# Patient Record
Sex: FEMALE | Race: WHITE | Employment: UNEMPLOYED | URBAN - METROPOLITAN AREA
[De-identification: names, ages, dates, MRNs, and addresses within clinical notes are randomized per-mention and may not be internally consistent; named-entity substitution may affect disease eponyms.]

---

## 2017-04-20 ENCOUNTER — TELEPHONE (OUTPATIENT)
Dept: INTERNAL MEDICINE CLINIC | Facility: CLINIC | Age: 32
End: 2017-04-20

## 2017-04-20 DIAGNOSIS — G80.9 CEREBRAL PALSY, UNSPECIFIED TYPE (HCC): Primary | ICD-10-CM

## 2017-04-20 NOTE — TELEPHONE ENCOUNTER
Incoming (mail or fax): Fax  Received from:  Ju- signature required  Documentation given to: [id number] fax bin.

## 2017-04-24 NOTE — TELEPHONE ENCOUNTER
Patient's mother scheduled an appointment for May 10th at 12:00. She is asking that the wheelchair parts be replaced as they have a wedding in June and it will take some time for this to be repaired.   Please advise

## 2017-04-24 NOTE — TELEPHONE ENCOUNTER
Mother asking for rx for wheelchair supplies be ordered as replacement parts needed and takes time for repair. Pt has May 10th f/u visit.

## 2017-05-10 ENCOUNTER — OFFICE VISIT (OUTPATIENT)
Dept: INTERNAL MEDICINE CLINIC | Facility: CLINIC | Age: 32
End: 2017-05-10

## 2017-05-10 VITALS
HEART RATE: 120 BPM | TEMPERATURE: 98 F | RESPIRATION RATE: 20 BRPM | OXYGEN SATURATION: 96 % | DIASTOLIC BLOOD PRESSURE: 60 MMHG | SYSTOLIC BLOOD PRESSURE: 90 MMHG

## 2017-05-10 DIAGNOSIS — Z00.00 ROUTINE PHYSICAL EXAMINATION: Primary | ICD-10-CM

## 2017-05-10 PROCEDURE — 99395 PREV VISIT EST AGE 18-39: CPT | Performed by: INTERNAL MEDICINE

## 2017-05-10 NOTE — PROGRESS NOTES
HPI:   Jose David Tompkins is a 27year old female who presents for a complete physical exam.  Pt wheelchair dependent  Pt eating a lot per mom loves food  She will be maid of honor at her sister Nabila montalvo ([facility] [place] [employer] dress theme)  She is excited about Occ: none. : single . Exercise: none.   Diet: cheese, yogurt  Takes calcium no vit D     REVIEW OF SYSTEMS:   GENERAL: feels well otherwise  SKIN: no rash  EYES:denies blurred vision or double vision  HEENT: denies nasal congestion, si a 27year old female who presents for a complete physical exam. explained. Health maintenance, will check fasting Lipids, CMP, and TSH.     Pt' s weight is not done pt  wheelchair dependenet  The patient indicates understanding of these issues and agrees to

## 2017-07-08 ENCOUNTER — HOSPITAL ENCOUNTER (OUTPATIENT)
Age: 32
Discharge: HOME | End: 2017-07-08
Attending: FAMILY MEDICINE
Payer: COMMERCIAL

## 2017-07-08 VITALS
OXYGEN SATURATION: 98 % | DIASTOLIC BLOOD PRESSURE: 80 MMHG | SYSTOLIC BLOOD PRESSURE: 124 MMHG | TEMPERATURE: 100 F | RESPIRATION RATE: 18 BRPM | HEART RATE: 99 BPM

## 2017-07-08 DIAGNOSIS — H61.21 IMPACTED CERUMEN OF RIGHT EAR: Primary | ICD-10-CM

## 2017-07-08 PROCEDURE — 99203 OFFICE O/P NEW LOW 30 MIN: CPT

## 2017-07-08 PROCEDURE — 99213 OFFICE O/P EST LOW 20 MIN: CPT

## 2017-07-08 PROCEDURE — 69210 REMOVE IMPACTED EAR WAX UNI: CPT

## 2017-07-08 NOTE — ED INITIAL ASSESSMENT (HPI)
Pt c/o right ear pain for the past 3 weeks. Pt also c/o sinus congestion. Denies any other complaints.

## 2017-07-08 NOTE — ED PROVIDER NOTES
Patient Seen in: [facility]    History   Patient presents with:  Ear Problem Pain (neurosensory)    Stated Complaint: Rt Ear Pain X 3 Weeks     HPI    Светланаl David is a 27year old female with previous history of cerebral Systems    Positive for stated complaint: Rt Ear Pain X 3 Weeks   Other systems are as noted in HPI. Constitutional and vital signs reviewed. All other systems reviewed and negative except as noted above.     PSFH elements reviewed from today and agre PM

## 2017-07-10 NOTE — ADDENDUM NOTE
Encounter addended by: Eloy Temple LPN on: 1/33/0252 37:79 AM<BR>    Actions taken: Letter status changed

## 2019-06-16 ENCOUNTER — OFFICE VISIT (OUTPATIENT)
Dept: FAMILY MEDICINE CLINIC | Facility: CLINIC | Age: 34
End: 2019-06-16
Payer: COMMERCIAL

## 2019-06-16 VITALS
HEART RATE: 120 BPM | SYSTOLIC BLOOD PRESSURE: 98 MMHG | OXYGEN SATURATION: 98 % | DIASTOLIC BLOOD PRESSURE: 66 MMHG | TEMPERATURE: 101 F

## 2019-06-16 DIAGNOSIS — R50.9 FEVER, UNSPECIFIED FEVER CAUSE: Primary | ICD-10-CM

## 2019-06-16 PROCEDURE — 87880 STREP A ASSAY W/OPTIC: CPT | Performed by: NURSE PRACTITIONER

## 2019-06-16 PROCEDURE — 87081 CULTURE SCREEN ONLY: CPT | Performed by: NURSE PRACTITIONER

## 2019-06-16 PROCEDURE — 99202 OFFICE O/P NEW SF 15 MIN: CPT | Performed by: NURSE PRACTITIONER

## 2019-06-16 NOTE — PROGRESS NOTES
CHIEF COMPLAINT:   Patient presents with:  Sore Throat: sore throat,sinus drainage, fever in the morning x 1 day      HPI:   Claudeen Moss is a 28year old female who presents for Fever Tmax 101.2 for 24 hours.  Febrile this morning- treated with advil and areas  HEAD: atraumatic, normocephalic.    EYES: conjunctiva clear, EOM intact  EARS: TM's intact, non- bulging,  No retraction, no fluid, bony landmarks present  NOSE: Nostrils patent, minimal clear nasal discharge, nasal mucosa mildly inflamed and edemato

## 2019-09-20 ENCOUNTER — OFFICE VISIT (OUTPATIENT)
Dept: INTERNAL MEDICINE CLINIC | Facility: CLINIC | Age: 34
End: 2019-09-20
Payer: COMMERCIAL

## 2019-09-20 VITALS
SYSTOLIC BLOOD PRESSURE: 110 MMHG | RESPIRATION RATE: 14 BRPM | OXYGEN SATURATION: 98 % | DIASTOLIC BLOOD PRESSURE: 68 MMHG | TEMPERATURE: 99 F | HEART RATE: 76 BPM

## 2019-09-20 DIAGNOSIS — Z13.220 SCREENING FOR LIPID DISORDERS: ICD-10-CM

## 2019-09-20 DIAGNOSIS — Z23 NEED FOR VACCINATION: ICD-10-CM

## 2019-09-20 DIAGNOSIS — Z13.29 SCREENING FOR THYROID DISORDER: ICD-10-CM

## 2019-09-20 DIAGNOSIS — G80.0 SPASTIC QUADRIPLEGIC CEREBRAL PALSY (HCC): ICD-10-CM

## 2019-09-20 DIAGNOSIS — M62.838 MUSCLE SPASM: ICD-10-CM

## 2019-09-20 DIAGNOSIS — Z00.00 ENCOUNTER FOR ANNUAL PHYSICAL EXAM: Primary | ICD-10-CM

## 2019-09-20 DIAGNOSIS — Z13.0 SCREENING FOR BLOOD DISEASE: ICD-10-CM

## 2019-09-20 PROCEDURE — 99395 PREV VISIT EST AGE 18-39: CPT | Performed by: INTERNAL MEDICINE

## 2019-09-20 PROCEDURE — 90471 IMMUNIZATION ADMIN: CPT | Performed by: INTERNAL MEDICINE

## 2019-09-20 PROCEDURE — 90686 IIV4 VACC NO PRSV 0.5 ML IM: CPT | Performed by: INTERNAL MEDICINE

## 2019-09-20 RX ORDER — BACLOFEN 5 MG/1
5 TABLET ORAL 3 TIMES DAILY
Qty: 90 TABLET | Refills: 2 | Status: SHIPPED | OUTPATIENT
Start: 2019-09-20 | End: 2019-10-20

## 2019-09-20 NOTE — PROGRESS NOTES
[facility] Annual Physical Exam Note    HPI:    Patient ID: Donny Zhao is a 28year old female.   Chief Complaint: Physical (Last: 217)  41-year-old female with past medical history of cerebral palsy with spastic quadriplegia as well as scol n/a    Obesity Screening  There is no height or weight on file to calculate BMI.     Diabetes Screening  No results found for: EAG, A1C    Dyslipidemia Screening  No results found for: CHOLEST, TRIG, HDL, LDL, VLDL, TCHDLRATIO, NONHDLC, CHOLHDLRATIO, NONHDL 07/14/2009         Past Medical History:   Diagnosis Date   • Allergic rhinitis 7/14/2009   • Allergy history, eggs    • Bleeding mole 11/2009    L face   • Foot callus 6/2008    R foot   • Neck pain on left side 7/2008   • Otitis 3/04,9/05    L   • Pharyn present. Mouth/Throat: Oropharynx is clear and moist. No oropharyngeal exudate or posterior oropharyngeal erythema. Eyes: Pupils are equal, round, and reactive to light. Conjunctivae are normal. No scleral icterus. Neck: Neck supple. No JVD present.  Arslan File ages 35 to 77      Imaging:   Pertinent imaging results reviewed. No results found. Assessment/Plan:   Diagnoses and all orders for this visit:    Encounter for annual physical exam  -     COMP METABOLIC PANEL (14);  Future    Screening for thyroid months to follow-up on benefit of baclofen and to monitor for adverse effects. Follow Up:   Return in about 3 months (around 12/20/2019) for follow up muscle spasms.     Patient asked to sign release of information from prior physicians/outside consult

## 2019-09-21 ENCOUNTER — LAB ENCOUNTER (OUTPATIENT)
Dept: LAB | Facility: HOSPITAL | Age: 34
End: 2019-09-21
Attending: INTERNAL MEDICINE
Payer: COMMERCIAL

## 2019-09-21 DIAGNOSIS — Z13.220 SCREENING FOR LIPID DISORDERS: ICD-10-CM

## 2019-09-21 DIAGNOSIS — Z13.0 SCREENING FOR BLOOD DISEASE: ICD-10-CM

## 2019-09-21 DIAGNOSIS — G80.0 SPASTIC QUADRIPLEGIC CEREBRAL PALSY (HCC): ICD-10-CM

## 2019-09-21 DIAGNOSIS — Z13.29 SCREENING FOR THYROID DISORDER: ICD-10-CM

## 2019-09-21 DIAGNOSIS — Z00.00 ENCOUNTER FOR ANNUAL PHYSICAL EXAM: ICD-10-CM

## 2019-09-21 LAB
ALBUMIN SERPL-MCNC: 4.1 G/DL (ref 3.4–5)
ALBUMIN/GLOB SERPL: 1.1 {RATIO} (ref 1–2)
ALP LIVER SERPL-CCNC: 50 U/L (ref 37–98)
ALT SERPL-CCNC: 17 U/L (ref 13–56)
ANION GAP SERPL CALC-SCNC: 5 MMOL/L (ref 0–18)
AST SERPL-CCNC: 12 U/L (ref 15–37)
BASOPHILS # BLD AUTO: 0.04 X10(3) UL (ref 0–0.2)
BASOPHILS NFR BLD AUTO: 0.6 %
BILIRUB SERPL-MCNC: 0.9 MG/DL (ref 0.1–2)
BUN BLD-MCNC: 7 MG/DL (ref 7–18)
BUN/CREAT SERPL: 15.9 (ref 10–20)
CALCIUM BLD-MCNC: 9.2 MG/DL (ref 8.5–10.1)
CHLORIDE SERPL-SCNC: 107 MMOL/L (ref 98–112)
CHOLEST SMN-MCNC: 144 MG/DL (ref ?–200)
CO2 SERPL-SCNC: 26 MMOL/L (ref 21–32)
CREAT BLD-MCNC: 0.44 MG/DL (ref 0.55–1.02)
DEPRECATED RDW RBC AUTO: 41.6 FL (ref 35.1–46.3)
EOSINOPHIL # BLD AUTO: 0.15 X10(3) UL (ref 0–0.7)
EOSINOPHIL NFR BLD AUTO: 2.2 %
ERYTHROCYTE [DISTWIDTH] IN BLOOD BY AUTOMATED COUNT: 12.6 % (ref 11–15)
GLOBULIN PLAS-MCNC: 3.7 G/DL (ref 2.8–4.4)
GLUCOSE BLD-MCNC: 89 MG/DL (ref 70–99)
HCT VFR BLD AUTO: 42.4 % (ref 35–48)
HDLC SERPL-MCNC: 57 MG/DL (ref 40–59)
HGB BLD-MCNC: 13.6 G/DL (ref 12–16)
IMM GRANULOCYTES # BLD AUTO: 0.01 X10(3) UL (ref 0–1)
IMM GRANULOCYTES NFR BLD: 0.1 %
LDLC SERPL CALC-MCNC: 76 MG/DL (ref ?–100)
LYMPHOCYTES # BLD AUTO: 1.93 X10(3) UL (ref 1–4)
LYMPHOCYTES NFR BLD AUTO: 28.8 %
M PROTEIN MFR SERPL ELPH: 7.8 G/DL (ref 6.4–8.2)
MCH RBC QN AUTO: 29.1 PG (ref 26–34)
MCHC RBC AUTO-ENTMCNC: 32.1 G/DL (ref 31–37)
MCV RBC AUTO: 90.8 FL (ref 80–100)
MONOCYTES # BLD AUTO: 0.84 X10(3) UL (ref 0.1–1)
MONOCYTES NFR BLD AUTO: 12.6 %
NEUTROPHILS # BLD AUTO: 3.72 X10 (3) UL (ref 1.5–7.7)
NEUTROPHILS # BLD AUTO: 3.72 X10(3) UL (ref 1.5–7.7)
NEUTROPHILS NFR BLD AUTO: 55.7 %
NONHDLC SERPL-MCNC: 87 MG/DL (ref ?–130)
OSMOLALITY SERPL CALC.SUM OF ELEC: 283 MOSM/KG (ref 275–295)
PLATELET # BLD AUTO: 231 10(3)UL (ref 150–450)
POTASSIUM SERPL-SCNC: 4.4 MMOL/L (ref 3.5–5.1)
RBC # BLD AUTO: 4.67 X10(6)UL (ref 3.8–5.3)
SODIUM SERPL-SCNC: 138 MMOL/L (ref 136–145)
TRIGL SERPL-MCNC: 53 MG/DL (ref 30–149)
TSI SER-ACNC: 1.69 MIU/ML (ref 0.36–3.74)
VIT D+METAB SERPL-MCNC: 22.5 NG/ML (ref 30–100)
VLDLC SERPL CALC-MCNC: 11 MG/DL (ref 0–30)
WBC # BLD AUTO: 6.7 X10(3) UL (ref 4–11)

## 2019-09-21 PROCEDURE — 80053 COMPREHEN METABOLIC PANEL: CPT

## 2019-09-21 PROCEDURE — 85025 COMPLETE CBC W/AUTO DIFF WBC: CPT

## 2019-09-21 PROCEDURE — 36415 COLL VENOUS BLD VENIPUNCTURE: CPT

## 2019-09-21 PROCEDURE — 84443 ASSAY THYROID STIM HORMONE: CPT

## 2019-09-21 PROCEDURE — 80061 LIPID PANEL: CPT

## 2019-09-21 PROCEDURE — 82306 VITAMIN D 25 HYDROXY: CPT

## 2019-09-23 ENCOUNTER — TELEPHONE (OUTPATIENT)
Dept: INTERNAL MEDICINE CLINIC | Facility: CLINIC | Age: 34
End: 2019-09-23

## 2019-10-14 DIAGNOSIS — G80.0 SPASTIC QUADRIPLEGIC CEREBRAL PALSY (HCC): ICD-10-CM

## 2019-10-16 RX ORDER — BACLOFEN 5 MG/1
TABLET ORAL
Qty: 90 TABLET | Refills: 2 | OUTPATIENT
Start: 2019-10-16

## 2019-12-16 DIAGNOSIS — G80.0 SPASTIC QUADRIPLEGIC CEREBRAL PALSY (HCC): Primary | ICD-10-CM

## 2019-12-17 NOTE — TELEPHONE ENCOUNTER
Last OV relevant to medication: 9/20/19  Last refill date: 9/20/19     #/refills: 90/2  When pt was asked to return for OV: 3 months  Upcoming appt/reason: none scheduled    Called and LVM per HIPAA requesting call back to schedule follow up appointment an

## 2019-12-18 NOTE — TELEPHONE ENCOUNTER
2nd message left, detailed per HIPAA.   Advised to call back to schedule 3 month follow-up  & to obtain condition update r/t baclofen

## 2019-12-19 RX ORDER — BACLOFEN 5 MG/1
TABLET ORAL
Qty: 90 TABLET | Refills: 0 | Status: SHIPPED | OUTPATIENT
Start: 2019-12-19 | End: 2020-01-15

## 2020-01-13 ENCOUNTER — TELEPHONE (OUTPATIENT)
Dept: INTERNAL MEDICINE CLINIC | Facility: CLINIC | Age: 35
End: 2020-01-13

## 2020-01-13 ENCOUNTER — OFFICE VISIT (OUTPATIENT)
Dept: INTERNAL MEDICINE CLINIC | Facility: CLINIC | Age: 35
End: 2020-01-13
Payer: COMMERCIAL

## 2020-01-13 VITALS
DIASTOLIC BLOOD PRESSURE: 72 MMHG | RESPIRATION RATE: 16 BRPM | TEMPERATURE: 97 F | HEART RATE: 104 BPM | OXYGEN SATURATION: 99 % | BODY MASS INDEX: 16.22 KG/M2 | HEIGHT: 64 IN | WEIGHT: 95 LBS | SYSTOLIC BLOOD PRESSURE: 104 MMHG

## 2020-01-13 DIAGNOSIS — G80.0 SPASTIC QUADRIPLEGIC CEREBRAL PALSY (HCC): Primary | ICD-10-CM

## 2020-01-13 DIAGNOSIS — G62.9 NEUROPATHY: ICD-10-CM

## 2020-01-13 PROCEDURE — 99214 OFFICE O/P EST MOD 30 MIN: CPT | Performed by: INTERNAL MEDICINE

## 2020-01-13 NOTE — PROGRESS NOTES
Per Dr. Vaibhav Mchugh, faxed a letter to [facility], attn: Renetta Lyle, fax #[phone] requesting a new  motorized wheelchair for pt. Rec fax confirm.

## 2020-01-13 NOTE — PROGRESS NOTES
Established Patient Progress Note  Chief Complaint:   Patient presents with: Follow - Up: Med Check - Baclofen      HPI:   This is a 27year old female coming in for F/u spastic quadriplegic cerebral palsy. She was prescribed baclofen at her last visit. Known Allergies  Current Outpatient Medications   Medication Sig Dispense Refill   • DULoxetine HCl (DRIZALMA SPRINKLE) 30 MG Oral Capsule Delayed Release Sprinkle Take 1 capsule by mouth daily.  30 capsule 2   • BACLOFEN 5 MG Oral Tab TAKE 1 TABLET BY MOUT neuropathic pain. Unfortunately, generic cymbalta will not work for patient. She has swallowing difficulties related to her cerebral palsy and usually takes her meds crushed in applesauce. Therefore, our only option is Drizalma 30mg daily with applesauce.

## 2020-01-13 NOTE — TELEPHONE ENCOUNTER
Rianna Munoz from [facility] called and stated that the order that she received today was not complete. She states that it needs to have a treatment plan on it. Please fax a new order to [phone].   Thank you

## 2020-01-15 ENCOUNTER — TELEPHONE (OUTPATIENT)
Dept: INTERNAL MEDICINE CLINIC | Facility: CLINIC | Age: 35
End: 2020-01-15

## 2020-01-15 DIAGNOSIS — G62.9 NEUROPATHY: Primary | ICD-10-CM

## 2020-01-15 DIAGNOSIS — G80.0 SPASTIC QUADRIPLEGIC CEREBRAL PALSY (HCC): ICD-10-CM

## 2020-01-15 RX ORDER — BACLOFEN 5 MG/1
1 TABLET ORAL 3 TIMES DAILY
Qty: 270 TABLET | Refills: 1 | Status: SHIPPED | OUTPATIENT
Start: 2020-01-15 | End: 2020-01-16

## 2020-01-15 NOTE — TELEPHONE ENCOUNTER
Received fax from pharmacy stating [address] sprinkle is not covered by patient's insurance. Noted patient takes medication crushed in applesauce, per OV note dated 1/13/2020.     Also noted in OV note of same date, venlafaxine 75-225mg per day may be a cons

## 2020-01-15 NOTE — TELEPHONE ENCOUNTER
Last OV relevant to medication: 1/13/2020  Last refill date: 12/19/2019     #/refills: 90/0 - TID  When pt was asked to return for OV: 4 weeks - Neuropathy Follow Up  Upcoming appt/reason: None

## 2020-01-16 RX ORDER — BACLOFEN 5 MG/1
1 TABLET ORAL 3 TIMES DAILY PRN
Qty: 270 TABLET | Refills: 1 | COMMUNITY
Start: 2020-01-16 | End: 2020-11-04

## 2020-01-16 RX ORDER — DULOXETIN HYDROCHLORIDE 30 MG/1
30 CAPSULE, DELAYED RELEASE ORAL DAILY
Qty: 30 CAPSULE | Refills: 2 | Status: SHIPPED | OUTPATIENT
Start: 2020-01-16 | End: 2021-11-29 | Stop reason: ALTCHOICE

## 2020-01-16 NOTE — TELEPHONE ENCOUNTER
Called and LVM per HIPAA advising of prescription change and reason. Advised of directions for prescription as well, including that pellets and applesauce must be consumed within 2 hours otherwise mixture becomes unstable.  Advised in message to please call

## 2020-01-16 NOTE — TELEPHONE ENCOUNTER
Excellent find! Thank you for doing some research on this. Agree with using Duloxetine delayed release capsule. Discuss with patient's mom and send in new rx if agreeable. Thanks again!

## 2020-01-20 NOTE — TELEPHONE ENCOUNTER
DME referral updated, to Dr Tomy Saldaña for review as requested. To fax to number below once approved.

## 2020-02-06 ENCOUNTER — TELEPHONE (OUTPATIENT)
Dept: INTERNAL MEDICINE CLINIC | Facility: CLINIC | Age: 35
End: 2020-02-06

## 2020-02-06 NOTE — TELEPHONE ENCOUNTER
Received \"Residual Capacity Examination Report\" from [facility] for patient. These forms are to be completed for patient to receive in-home CNA to assist her. Noted forms previously completed by Dr. Leann Joel in 2014.  Scanned forms f

## 2020-02-06 NOTE — TELEPHONE ENCOUNTER
Incoming (mail or fax):  fax  Received from:  [facility]  Documentation given to:  triage

## 2020-02-18 NOTE — TELEPHONE ENCOUNTER
Pulled signed/completed Residual Capacity Examination Report from [facility] from Dr. Prince Briggs out box and gave to Paula Gayle, [facility].

## 2020-02-24 ENCOUNTER — TELEPHONE (OUTPATIENT)
Dept: INTERNAL MEDICINE CLINIC | Facility: CLINIC | Age: 35
End: 2020-02-24

## 2020-02-25 NOTE — TELEPHONE ENCOUNTER
Pt takes baclofen BID not TID. Also, pt insurance does not cover 5 mg tabs. Pt mom wondering if RX can be sent for 10 mg tabs take 1/2 pill BID. RX pending.

## 2020-02-25 NOTE — TELEPHONE ENCOUNTER
Patient's mom, Radha Sumner (on HIPAA), is wondering if Dr Amando Flores could prescribe 10 MG baclefen and cut the pills in half because she takes 5 MG. Insurance does not cover 5 MG except for brand name so it would be too expensive.   Also, she takes it once at bedt

## 2020-02-26 ENCOUNTER — MED REC SCAN ONLY (OUTPATIENT)
Dept: INTERNAL MEDICINE CLINIC | Facility: CLINIC | Age: 35
End: 2020-02-26

## 2020-02-26 RX ORDER — BACLOFEN 10 MG/1
5 TABLET ORAL 2 TIMES DAILY
Qty: 90 TABLET | Refills: 1 | Status: SHIPPED | OUTPATIENT
Start: 2020-02-26 | End: 2020-12-01

## 2020-02-26 NOTE — TELEPHONE ENCOUNTER
Spoke to pt's mother on HIPAA. Made aware 10mg tablets sent to pharmacy as requested. Pt voiced understanding.

## 2020-03-10 ENCOUNTER — MED REC SCAN ONLY (OUTPATIENT)
Dept: INTERNAL MEDICINE CLINIC | Facility: CLINIC | Age: 35
End: 2020-03-10

## 2020-11-02 ENCOUNTER — TELEPHONE (OUTPATIENT)
Dept: INTERNAL MEDICINE CLINIC | Facility: CLINIC | Age: 35
End: 2020-11-02

## 2020-11-02 NOTE — TELEPHONE ENCOUNTER
Note sent back to [facility] to advise of below. Form in triage pending. PSR - please follow up with pt. She is overdue for follow up visit and will need to estab with new provider here before we can address appeal letter.

## 2020-11-02 NOTE — TELEPHONE ENCOUNTER
Incoming (mail or fax):  fax  Received from:  [facility]   Documentation given to:   Incoming triage bin     Appeal letter to be sign and faxed back

## 2020-11-02 NOTE — TELEPHONE ENCOUNTER
This patient has not been seen since January. The patient needs to be seen first and needs to choose another provider. Once care is established the appeal letter can be signed. Thanks.

## 2020-11-02 NOTE — TELEPHONE ENCOUNTER
DME appeal form letter received from [address] [place] [address] regarding recent denial of power wheelchair. Letter created by PT, requires PCP office signature. Former pt of Dr Suazo Linkedwith.

## 2020-11-04 ENCOUNTER — OFFICE VISIT (OUTPATIENT)
Dept: INTERNAL MEDICINE CLINIC | Facility: CLINIC | Age: 35
End: 2020-11-04
Payer: COMMERCIAL

## 2020-11-04 VITALS
HEIGHT: 64 IN | DIASTOLIC BLOOD PRESSURE: 78 MMHG | SYSTOLIC BLOOD PRESSURE: 112 MMHG | OXYGEN SATURATION: 99 % | RESPIRATION RATE: 14 BRPM | HEART RATE: 106 BPM | BODY MASS INDEX: 16.22 KG/M2 | WEIGHT: 95 LBS

## 2020-11-04 DIAGNOSIS — Z13.220 SCREENING FOR LIPID DISORDERS: ICD-10-CM

## 2020-11-04 DIAGNOSIS — Z00.00 ENCOUNTER FOR ANNUAL HEALTH EXAMINATION: Primary | ICD-10-CM

## 2020-11-04 DIAGNOSIS — Z13.29 SCREENING FOR THYROID DISORDER: ICD-10-CM

## 2020-11-04 DIAGNOSIS — Z23 NEED FOR VACCINATION: ICD-10-CM

## 2020-11-04 DIAGNOSIS — R01.1 MURMUR, CARDIAC: ICD-10-CM

## 2020-11-04 DIAGNOSIS — G80.0 SPASTIC QUADRIPLEGIC CEREBRAL PALSY (HCC): ICD-10-CM

## 2020-11-04 PROCEDURE — 99395 PREV VISIT EST AGE 18-39: CPT | Performed by: NURSE PRACTITIONER

## 2020-11-04 PROCEDURE — 90686 IIV4 VACC NO PRSV 0.5 ML IM: CPT | Performed by: NURSE PRACTITIONER

## 2020-11-04 PROCEDURE — 3078F DIAST BP <80 MM HG: CPT | Performed by: NURSE PRACTITIONER

## 2020-11-04 PROCEDURE — 90471 IMMUNIZATION ADMIN: CPT | Performed by: NURSE PRACTITIONER

## 2020-11-04 PROCEDURE — 3008F BODY MASS INDEX DOCD: CPT | Performed by: NURSE PRACTITIONER

## 2020-11-04 PROCEDURE — 3074F SYST BP LT 130 MM HG: CPT | Performed by: NURSE PRACTITIONER

## 2020-11-04 NOTE — PROGRESS NOTES
Per Jovita Desouza, sent signed Durable Medical Equipment Appeal Letter to [facility] [place], ATTN: Chelo Garcia, PT. CN#[phone] and Fax#[phone]. Rec'd fax confirmation.

## 2020-11-04 NOTE — PROGRESS NOTES
CHIEF COMPLAINT   Complete physical, form for wheelchair    HPI:   Chantelle Puga is a 27year old female who presents for a complete physical exam.     Wt Readings from Last 6 Encounters:  11/04/20 : 95 lb (43.1 kg)  01/13/20 : 95 lb (43.1 kg)  02/02/16 : 11/06   • UTI (urinary tract infection) 1/19/2010      Past Surgical History:   Procedure Laterality Date   • DENTAL SURGERY PROCEDURE      x2 since birth   • OTHER SURGICAL HISTORY  1989    spinal fusion from thoracic spine on down-severe scoliosis   • OT normocephalic  BREAST:DEFERRED TO GYNE  LUNGS: clear to auscultation; no rhonchi, rales, or wheezing  CARDIO: tachycardia with regular rhythm with a grade 3 systolic murmur to the left lower sternal border.    GI: good BS's,no masses, organomegaly or tender 16.31 kg/m². Flu shot given, TDAP needed. Labs ordered. The patient does not smoke or drink alcohol.  - CBC WITH DIFFERENTIAL WITH PLATELET; Future  - COMP METABOLIC PANEL (14); Future    2.  Screening for thyroid disorder  - TSH W REFLEX TO FREE T4; Future

## 2020-11-04 NOTE — PATIENT INSTRUCTIONS
TDAP vaccine needed at next visit    Get your labs done. You should be fasting for at least 10 hours. If you take a multivitamin with Biotin or any biotin product it should be held for 3 days prior to getting your labs done. Make an appointment online.

## 2020-12-01 ENCOUNTER — MED REC SCAN ONLY (OUTPATIENT)
Dept: INTERNAL MEDICINE CLINIC | Facility: CLINIC | Age: 35
End: 2020-12-01

## 2020-12-01 DIAGNOSIS — M62.838 MUSCLE SPASM: Primary | ICD-10-CM

## 2020-12-02 RX ORDER — BACLOFEN 10 MG/1
5 TABLET ORAL 2 TIMES DAILY
Qty: 90 TABLET | Refills: 1 | Status: SHIPPED | OUTPATIENT
Start: 2020-12-02 | End: 2021-11-29

## 2020-12-02 NOTE — TELEPHONE ENCOUNTER
Last OV relevant to medication: 11/4/2020 - PE  Last refill date: 2/26/2020  #/refills: 90/1 (takes 0.5 tab daily)  When pt was asked to return for OV: 1 yr or PRN  Upcoming appt/reason: None

## 2020-12-15 ENCOUNTER — TELEPHONE (OUTPATIENT)
Dept: INTERNAL MEDICINE CLINIC | Facility: CLINIC | Age: 35
End: 2020-12-15

## 2020-12-15 NOTE — TELEPHONE ENCOUNTER
Noted below. The letter we received now looks like they got our appeal. It does not look they said anything about denying it again yet. Is it in process? Thanks.

## 2020-12-15 NOTE — TELEPHONE ENCOUNTER
Appeal for wheelchair sent by [employer] and PT was denied. See 11/4/20 OV notes. FYI received from insurance. Copy was sent to [employer] () and pt.

## 2020-12-15 NOTE — TELEPHONE ENCOUNTER
Incoming (mail or fax):  fax  Received from:  [address] [place] [address]  Documentation given to:  Triage    Please refer to TE dated 2/24/20

## 2021-01-06 NOTE — TELEPHONE ENCOUNTER
Sonu called from [place] and said that under careful consideration they have uphold the denial on the appeal and we will receive a letter in 10 business days stating the reason and there will be a phone number to contact on the letter.      Ref# [id number]

## 2021-01-15 NOTE — TELEPHONE ENCOUNTER
Noted the documentation. It looks like they will internally review it and reach a determination at that point. Thanks.

## 2021-04-24 ENCOUNTER — IMMUNIZATION (OUTPATIENT)
Dept: LAB | Facility: HOSPITAL | Age: 36
End: 2021-04-24
Attending: EMERGENCY MEDICINE
Payer: COMMERCIAL

## 2021-04-24 DIAGNOSIS — Z23 NEED FOR VACCINATION: Primary | ICD-10-CM

## 2021-04-24 PROCEDURE — 0011A SARSCOV2 VAC 100MCG/0.5ML IM: CPT

## 2021-05-22 ENCOUNTER — IMMUNIZATION (OUTPATIENT)
Dept: LAB | Facility: HOSPITAL | Age: 36
End: 2021-05-22
Attending: EMERGENCY MEDICINE
Payer: COMMERCIAL

## 2021-05-22 DIAGNOSIS — Z23 NEED FOR VACCINATION: Primary | ICD-10-CM

## 2021-05-22 PROCEDURE — 0012A SARSCOV2 VAC 100MCG/0.5ML IM: CPT

## 2021-06-02 ENCOUNTER — TELEPHONE (OUTPATIENT)
Dept: INTERNAL MEDICINE CLINIC | Facility: CLINIC | Age: 36
End: 2021-06-02

## 2021-06-02 NOTE — TELEPHONE ENCOUNTER
Pts mom Driss Snider called and stated that she got called for jury duty and would like a note to dismiss herself from [place] duty due to Salvo being the Pt's only care giver. Pts mom would like to  the note when completed.   [place] duty starts June 25th

## 2021-06-03 NOTE — TELEPHONE ENCOUNTER
Spoke with Mom Araceli  Reminded to complete overdue labs    Letter printed and to Ang Krause for signature    To notify mom Milady Thibodeaux when ready

## 2021-06-03 NOTE — TELEPHONE ENCOUNTER
Yes okay to provide letter. Can you please also remind them to get amandas routine labs done when they have time. Remind them on the instructions for fasting and biotin. Thanks.

## 2021-11-09 ENCOUNTER — TELEPHONE (OUTPATIENT)
Dept: INTERNAL MEDICINE CLINIC | Facility: CLINIC | Age: 36
End: 2021-11-09

## 2021-11-09 DIAGNOSIS — M62.838 MUSCLE SPASM: ICD-10-CM

## 2021-11-09 RX ORDER — BACLOFEN 10 MG/1
5 TABLET ORAL 2 TIMES DAILY
Qty: 90 TABLET | Refills: 1 | OUTPATIENT
Start: 2021-11-09

## 2021-11-29 ENCOUNTER — OFFICE VISIT (OUTPATIENT)
Dept: INTERNAL MEDICINE CLINIC | Facility: CLINIC | Age: 36
End: 2021-11-29
Payer: COMMERCIAL

## 2021-11-29 VITALS
DIASTOLIC BLOOD PRESSURE: 62 MMHG | BODY MASS INDEX: 16 KG/M2 | OXYGEN SATURATION: 98 % | HEIGHT: 64 IN | TEMPERATURE: 100 F | HEART RATE: 129 BPM | SYSTOLIC BLOOD PRESSURE: 118 MMHG

## 2021-11-29 DIAGNOSIS — M62.838 MUSCLE SPASM: ICD-10-CM

## 2021-11-29 DIAGNOSIS — E55.9 VITAMIN D DEFICIENCY: ICD-10-CM

## 2021-11-29 DIAGNOSIS — Z13.220 SCREENING FOR CHOLESTEROL LEVEL: ICD-10-CM

## 2021-11-29 DIAGNOSIS — Z13.29 SCREENING FOR THYROID DISORDER: ICD-10-CM

## 2021-11-29 DIAGNOSIS — Z00.00 ENCOUNTER FOR ANNUAL PHYSICAL EXAM: Primary | ICD-10-CM

## 2021-11-29 PROCEDURE — 99395 PREV VISIT EST AGE 18-39: CPT | Performed by: NURSE PRACTITIONER

## 2021-11-29 PROCEDURE — 3074F SYST BP LT 130 MM HG: CPT | Performed by: NURSE PRACTITIONER

## 2021-11-29 PROCEDURE — 3078F DIAST BP <80 MM HG: CPT | Performed by: NURSE PRACTITIONER

## 2021-11-29 PROCEDURE — 3008F BODY MASS INDEX DOCD: CPT | Performed by: NURSE PRACTITIONER

## 2021-11-29 RX ORDER — BACLOFEN 10 MG/1
5 TABLET ORAL NIGHTLY
Qty: 90 TABLET | Refills: 1 | Status: SHIPPED | OUTPATIENT
Start: 2021-11-29 | End: 2022-01-25

## 2021-11-29 NOTE — PROGRESS NOTES
CHIEF COMPLAINT   Complete physical    HPI:   Ida Rose is a 38year old female who presents for a complete physical exam.     Wt Readings from Last 6 Encounters:  11/04/20 : 95 lb (43.1 kg)  01/13/20 : 95 lb (43.1 kg)  02/02/16 : 95 lb (43.1 kg)  03/ fusion from thoracic spine on down-severe scoliosis   • OTHER SURGICAL HISTORY  1988    bilat legs,  tendon lengthening      Family History   Problem Relation Age of Onset   • Heart Disease Paternal Grandmother    • Other (Other) Paternal Grandmother    • BREAST:DEFERRED TO GYNE  LUNGS: clear to auscultation; no rhonchi, rales, or wheezing  CARDIO: tachycardia without murmur  GI: good BS's,no masses, organomegaly or tenderness  :DEFERRED TO GYNE   MUSCULOSKELETAL: immobile, contracted hands and legs.  In VITAMIN D; Future    3. Screening for thyroid disorder  - TSH W REFLEX TO FREE T4; Future    4. Screening for cholesterol level  - LIPID PANEL; Future    5. Muscle spasm  - continue baclofen. - baclofen 10 MG Oral Tab;  Take 0.5 tablets (5 mg total) by mo

## 2021-11-29 NOTE — PATIENT INSTRUCTIONS
Debrox drops for ears.  Use for a week and follow up in the office if they still feel clogged    Send your pulse and temp readings to the office by tomorrow for an update    Get your COVID booster    Get your flu shot    Get your TDAP vaccine    Get your la

## 2021-12-01 ENCOUNTER — MED REC SCAN ONLY (OUTPATIENT)
Dept: INTERNAL MEDICINE CLINIC | Facility: CLINIC | Age: 36
End: 2021-12-01

## 2022-01-13 NOTE — TELEPHONE ENCOUNTER
Called [facility] and spoke with Lyly Reed to clarify what would be needed to assist patient in obtaining wheelchair. Per Lyly Reed, referral needs to state \"PT to evaluate and treat for wheelchair\".      Advised Lyly Reed that updated order, if signed, may not be sent 30.7

## 2022-01-25 DIAGNOSIS — M62.838 MUSCLE SPASM: ICD-10-CM

## 2022-01-25 RX ORDER — BACLOFEN 10 MG/1
5 TABLET ORAL NIGHTLY
Qty: 45 TABLET | Refills: 0 | Status: SHIPPED | OUTPATIENT
Start: 2022-01-25

## 2022-01-25 RX ORDER — BACLOFEN 10 MG/1
5 TABLET ORAL NIGHTLY
Qty: 45 TABLET | Refills: 0 | Status: SHIPPED | OUTPATIENT
Start: 2022-01-25 | End: 2022-01-25

## 2022-01-25 NOTE — TELEPHONE ENCOUNTER
Noted last refilled 11/29/21 for 90 tabs +1 refill to [facility]  But noted sig, take half tablet at hs.  Updated quantity to 45 & sent to new pharmacy at [facility].   DC'd from [facility]

## 2022-03-01 ENCOUNTER — TELEPHONE (OUTPATIENT)
Dept: INTERNAL MEDICINE CLINIC | Facility: CLINIC | Age: 37
End: 2022-03-01

## 2022-04-12 RX ORDER — BACLOFEN 10 MG/1
TABLET ORAL
Qty: 45 TABLET | Refills: 0 | Status: SHIPPED | OUTPATIENT
Start: 2022-04-12

## 2022-04-12 NOTE — TELEPHONE ENCOUNTER
Last OV relevant to medication: 11/29/21  Last refill date: 1/25/22 45   #/refills: 0  When pt was asked to return for OV: 1 year   Upcoming appt/reason: No future appointments. Was pt informed of any over due labs: due       Called the pt and Left message to call back   Need to know if pt still using medication and remind that pt had lab work due.

## 2022-07-08 DIAGNOSIS — M62.838 MUSCLE SPASM: ICD-10-CM

## 2022-07-08 RX ORDER — BACLOFEN 10 MG/1
TABLET ORAL
Qty: 45 TABLET | Refills: 0 | Status: SHIPPED | OUTPATIENT
Start: 2022-07-08

## 2022-07-08 NOTE — TELEPHONE ENCOUNTER
Last OV relevant to medication: 11/29/2021  Last refill date: 04/12/2022   #/refills: 45-0  When pt was asked to return for OV: Follow up in one year or sooner as needed  Upcoming appt/reason: n/a  Was pt informed of any over due labs:  Yes

## 2022-10-02 DIAGNOSIS — M62.838 MUSCLE SPASM: ICD-10-CM

## 2022-10-03 RX ORDER — BACLOFEN 10 MG/1
TABLET ORAL
Qty: 45 TABLET | Refills: 0 | Status: SHIPPED | OUTPATIENT
Start: 2022-10-03

## 2022-10-03 NOTE — TELEPHONE ENCOUNTER
Last OV relevant to medication: 11/29/2021  Last refill date: 07/08/2022   #/refills: 45-0  When pt was asked to return for OV: 1 year or prn  Upcoming appt/reason: n/a  Was pt informed of any over due labs: expected prior PE

## 2022-11-29 ENCOUNTER — HOSPITAL ENCOUNTER (EMERGENCY)
Facility: HOSPITAL | Age: 37
Discharge: LEFT BEFORE DISCHARGE | End: 2022-11-29
Payer: COMMERCIAL

## 2022-11-29 ENCOUNTER — HOSPITAL ENCOUNTER (OUTPATIENT)
Age: 37
Discharge: ANOTHER HOSPITAL | End: 2022-11-29
Payer: COMMERCIAL

## 2022-11-29 ENCOUNTER — LAB ENCOUNTER (OUTPATIENT)
Dept: LAB | Facility: HOSPITAL | Age: 37
End: 2022-11-29
Attending: NURSE PRACTITIONER
Payer: COMMERCIAL

## 2022-11-29 VITALS
DIASTOLIC BLOOD PRESSURE: 68 MMHG | HEART RATE: 110 BPM | BODY MASS INDEX: 16 KG/M2 | SYSTOLIC BLOOD PRESSURE: 108 MMHG | WEIGHT: 95 LBS | OXYGEN SATURATION: 97 % | TEMPERATURE: 98 F | RESPIRATION RATE: 18 BRPM

## 2022-11-29 VITALS
HEIGHT: 63 IN | DIASTOLIC BLOOD PRESSURE: 70 MMHG | HEART RATE: 110 BPM | WEIGHT: 95 LBS | BODY MASS INDEX: 16.83 KG/M2 | RESPIRATION RATE: 16 BRPM | SYSTOLIC BLOOD PRESSURE: 104 MMHG | TEMPERATURE: 99 F | OXYGEN SATURATION: 100 %

## 2022-11-29 DIAGNOSIS — Z00.00 ENCOUNTER FOR ANNUAL PHYSICAL EXAM: ICD-10-CM

## 2022-11-29 DIAGNOSIS — Z13.29 SCREENING FOR THYROID DISORDER: ICD-10-CM

## 2022-11-29 DIAGNOSIS — R50.9 FEBRILE ILLNESS: Primary | ICD-10-CM

## 2022-11-29 DIAGNOSIS — Z13.220 SCREENING FOR CHOLESTEROL LEVEL: ICD-10-CM

## 2022-11-29 DIAGNOSIS — E55.9 VITAMIN D DEFICIENCY: ICD-10-CM

## 2022-11-29 LAB
ALBUMIN SERPL-MCNC: 3.9 G/DL (ref 3.4–5)
ALBUMIN/GLOB SERPL: 1 {RATIO} (ref 1–2)
ALP LIVER SERPL-CCNC: 64 U/L
ALT SERPL-CCNC: 28 U/L
ANION GAP SERPL CALC-SCNC: 5 MMOL/L (ref 0–18)
AST SERPL-CCNC: 16 U/L (ref 15–37)
BASOPHILS # BLD AUTO: 0.02 X10(3) UL (ref 0–0.2)
BASOPHILS NFR BLD AUTO: 0.4 %
BILIRUB SERPL-MCNC: 0.4 MG/DL (ref 0.1–2)
BUN BLD-MCNC: 5 MG/DL (ref 7–18)
CALCIUM BLD-MCNC: 9 MG/DL (ref 8.5–10.1)
CHLORIDE SERPL-SCNC: 108 MMOL/L (ref 98–112)
CHOLEST SERPL-MCNC: 155 MG/DL (ref ?–200)
CO2 SERPL-SCNC: 27 MMOL/L (ref 21–32)
CREAT BLD-MCNC: 0.35 MG/DL
EOSINOPHIL # BLD AUTO: 0.02 X10(3) UL (ref 0–0.7)
EOSINOPHIL NFR BLD AUTO: 0.4 %
ERYTHROCYTE [DISTWIDTH] IN BLOOD BY AUTOMATED COUNT: 12.3 %
FASTING PATIENT LIPID ANSWER: YES
FASTING STATUS PATIENT QL REPORTED: YES
GFR SERPLBLD BASED ON 1.73 SQ M-ARVRAT: 135 ML/MIN/1.73M2 (ref 60–?)
GLOBULIN PLAS-MCNC: 3.8 G/DL (ref 2.8–4.4)
GLUCOSE BLD-MCNC: 95 MG/DL (ref 70–99)
HCT VFR BLD AUTO: 41.2 %
HDLC SERPL-MCNC: 55 MG/DL (ref 40–59)
HGB BLD-MCNC: 13.4 G/DL
IMM GRANULOCYTES # BLD AUTO: 0.01 X10(3) UL (ref 0–1)
IMM GRANULOCYTES NFR BLD: 0.2 %
LDLC SERPL CALC-MCNC: 88 MG/DL (ref ?–100)
LYMPHOCYTES # BLD AUTO: 1.07 X10(3) UL (ref 1–4)
LYMPHOCYTES NFR BLD AUTO: 21.1 %
MCH RBC QN AUTO: 29.7 PG (ref 26–34)
MCHC RBC AUTO-ENTMCNC: 32.5 G/DL (ref 31–37)
MCV RBC AUTO: 91.4 FL
MONOCYTES # BLD AUTO: 0.61 X10(3) UL (ref 0.1–1)
MONOCYTES NFR BLD AUTO: 12 %
NEUTROPHILS # BLD AUTO: 3.35 X10 (3) UL (ref 1.5–7.7)
NEUTROPHILS # BLD AUTO: 3.35 X10(3) UL (ref 1.5–7.7)
NEUTROPHILS NFR BLD AUTO: 65.9 %
NONHDLC SERPL-MCNC: 100 MG/DL (ref ?–130)
OSMOLALITY SERPL CALC.SUM OF ELEC: 287 MOSM/KG (ref 275–295)
PLATELET # BLD AUTO: 213 10(3)UL (ref 150–450)
POCT INFLUENZA A: NEGATIVE
POCT INFLUENZA B: NEGATIVE
POTASSIUM SERPL-SCNC: 4.3 MMOL/L (ref 3.5–5.1)
PROT SERPL-MCNC: 7.7 G/DL (ref 6.4–8.2)
RBC # BLD AUTO: 4.51 X10(6)UL
SODIUM SERPL-SCNC: 140 MMOL/L (ref 136–145)
TRIGL SERPL-MCNC: 56 MG/DL (ref 30–149)
TSI SER-ACNC: 1.79 MIU/ML (ref 0.36–3.74)
VIT D+METAB SERPL-MCNC: 8.7 NG/ML (ref 30–100)
VLDLC SERPL CALC-MCNC: 9 MG/DL (ref 0–30)
WBC # BLD AUTO: 5.1 X10(3) UL (ref 4–11)

## 2022-11-29 PROCEDURE — 80061 LIPID PANEL: CPT

## 2022-11-29 PROCEDURE — 82306 VITAMIN D 25 HYDROXY: CPT

## 2022-11-29 PROCEDURE — 80053 COMPREHEN METABOLIC PANEL: CPT

## 2022-11-29 PROCEDURE — 85025 COMPLETE CBC W/AUTO DIFF WBC: CPT

## 2022-11-29 PROCEDURE — 99214 OFFICE O/P EST MOD 30 MIN: CPT | Performed by: NURSE PRACTITIONER

## 2022-11-29 PROCEDURE — 84443 ASSAY THYROID STIM HORMONE: CPT

## 2022-11-29 PROCEDURE — 36415 COLL VENOUS BLD VENIPUNCTURE: CPT

## 2022-11-29 PROCEDURE — 87502 INFLUENZA DNA AMP PROBE: CPT | Performed by: NURSE PRACTITIONER

## 2022-11-29 NOTE — DISCHARGE INSTRUCTIONS
As discussed do feel that he needs to go to a higher level care than what we can find her at the immediate care. Go directly to desired ER for further evaluation and care. Have her eat or drink anything in route.

## 2022-11-29 NOTE — ED INITIAL ASSESSMENT (HPI)
Patient in wheelchair hx of CP. Fever 101 10 days ago, no other s/s. Then fever again last.  No other s/s. Denies UTI s/s.

## 2022-12-05 ENCOUNTER — TELEPHONE (OUTPATIENT)
Dept: INTERNAL MEDICINE CLINIC | Facility: CLINIC | Age: 37
End: 2022-12-05

## 2022-12-05 NOTE — TELEPHONE ENCOUNTER
Incoming (mail or fax):  sara  Received from:  [facility]  Documentation given to:  [address] [facility] record request

## 2022-12-05 NOTE — TELEPHONE ENCOUNTER
Medical Record Request Received    Date Received in Office: 12/5/22    Records Request Received From: [place] [employer]     Date Sent to Scan Stat: 12/6/22    STAT Request?: yes     Copy made for Provider & place in incoming folder: n/a

## 2022-12-07 ENCOUNTER — TELEPHONE (OUTPATIENT)
Dept: ORTHOPEDICS CLINIC | Facility: CLINIC | Age: 37
End: 2022-12-07

## 2022-12-07 ENCOUNTER — OFFICE VISIT (OUTPATIENT)
Dept: INTERNAL MEDICINE CLINIC | Facility: CLINIC | Age: 37
End: 2022-12-07
Payer: COMMERCIAL

## 2022-12-07 VITALS
HEART RATE: 100 BPM | RESPIRATION RATE: 20 BRPM | OXYGEN SATURATION: 99 % | SYSTOLIC BLOOD PRESSURE: 110 MMHG | DIASTOLIC BLOOD PRESSURE: 66 MMHG | BODY MASS INDEX: 16.22 KG/M2 | WEIGHT: 95 LBS | HEIGHT: 64 IN | TEMPERATURE: 98 F

## 2022-12-07 DIAGNOSIS — M62.838 MUSCLE SPASM: ICD-10-CM

## 2022-12-07 DIAGNOSIS — R00.0 TACHYCARDIA: ICD-10-CM

## 2022-12-07 DIAGNOSIS — R29.3 POSTURE ABNORMALITY: ICD-10-CM

## 2022-12-07 DIAGNOSIS — E55.9 VITAMIN D DEFICIENCY: ICD-10-CM

## 2022-12-07 DIAGNOSIS — M25.552 LEFT HIP PAIN: Primary | ICD-10-CM

## 2022-12-07 DIAGNOSIS — Z00.00 ENCOUNTER FOR ANNUAL PHYSICAL EXAM: Primary | ICD-10-CM

## 2022-12-07 DIAGNOSIS — Z23 NEED FOR VACCINATION: ICD-10-CM

## 2022-12-07 DIAGNOSIS — R01.1 HEART MURMUR: ICD-10-CM

## 2022-12-07 PROCEDURE — 90471 IMMUNIZATION ADMIN: CPT | Performed by: NURSE PRACTITIONER

## 2022-12-07 PROCEDURE — 3008F BODY MASS INDEX DOCD: CPT | Performed by: NURSE PRACTITIONER

## 2022-12-07 PROCEDURE — 99214 OFFICE O/P EST MOD 30 MIN: CPT | Performed by: NURSE PRACTITIONER

## 2022-12-07 PROCEDURE — 99395 PREV VISIT EST AGE 18-39: CPT | Performed by: NURSE PRACTITIONER

## 2022-12-07 PROCEDURE — 90686 IIV4 VACC NO PRSV 0.5 ML IM: CPT | Performed by: NURSE PRACTITIONER

## 2022-12-07 PROCEDURE — 3074F SYST BP LT 130 MM HG: CPT | Performed by: NURSE PRACTITIONER

## 2022-12-07 PROCEDURE — 3078F DIAST BP <80 MM HG: CPT | Performed by: NURSE PRACTITIONER

## 2022-12-07 RX ORDER — BACLOFEN 10 MG/1
5 TABLET ORAL NIGHTLY
Qty: 45 TABLET | Refills: 3 | Status: SHIPPED | OUTPATIENT
Start: 2022-12-07

## 2022-12-07 NOTE — PATIENT INSTRUCTIONS
Do vitamin D3 5000 international unit tablet daily.      Repeat vitamin D level in 3 months to monitor    Get the Tdap vaccine    Get the echocardiogram    See the Ortho specialist    Follow-up in 1 year or sooner as needed

## 2022-12-07 NOTE — TELEPHONE ENCOUNTER
Patient is coming in for LT HIP. At this time patient has not had any imaging done. Please place X-ray order accordingly, I have notified the patient to  come in earlier prior to appointment to have imaging done. Please forward to [facility] [place] and help schedule xrays. Thank you.     Future Appointments   Date Time Provider Dirk Ku   12/12/2022 11:10 AM MEMO Joseph [id number]

## 2022-12-12 ENCOUNTER — OFFICE VISIT (OUTPATIENT)
Dept: ORTHOPEDICS CLINIC | Facility: CLINIC | Age: 37
End: 2022-12-12
Payer: COMMERCIAL

## 2022-12-12 ENCOUNTER — HOSPITAL ENCOUNTER (OUTPATIENT)
Dept: GENERAL RADIOLOGY | Age: 37
Discharge: HOME | End: 2022-12-12
Attending: PHYSICIAN ASSISTANT
Payer: COMMERCIAL

## 2022-12-12 VITALS — HEIGHT: 64 IN | BODY MASS INDEX: 17.07 KG/M2 | WEIGHT: 100 LBS

## 2022-12-12 DIAGNOSIS — R29.3 POOR POSTURE: ICD-10-CM

## 2022-12-12 DIAGNOSIS — M41.9 SCOLIOSIS, UNSPECIFIED SCOLIOSIS TYPE, UNSPECIFIED SPINAL REGION: ICD-10-CM

## 2022-12-12 DIAGNOSIS — M25.552 LEFT HIP PAIN: ICD-10-CM

## 2022-12-12 DIAGNOSIS — M21.70 LOWER LIMB LENGTH DIFFERENCE: Primary | ICD-10-CM

## 2022-12-12 PROCEDURE — 99204 OFFICE O/P NEW MOD 45 MIN: CPT | Performed by: PHYSICIAN ASSISTANT

## 2022-12-12 PROCEDURE — 3008F BODY MASS INDEX DOCD: CPT | Performed by: PHYSICIAN ASSISTANT

## 2022-12-12 PROCEDURE — 73502 X-RAY EXAM HIP UNI 2-3 VIEWS: CPT | Performed by: PHYSICIAN ASSISTANT

## 2022-12-13 ENCOUNTER — TELEPHONE (OUTPATIENT)
Dept: ORTHOPEDICS CLINIC | Facility: CLINIC | Age: 37
End: 2022-12-13

## 2022-12-13 NOTE — TELEPHONE ENCOUNTER
Home health called and said that the patient would like to start PT this Friday. She woulld like confirmation that this is ok.  Please advise

## 2022-12-27 ENCOUNTER — TELEPHONE (OUTPATIENT)
Dept: ORTHOPEDICS CLINIC | Facility: CLINIC | Age: 37
End: 2022-12-27

## 2022-12-27 DIAGNOSIS — M21.70 LOWER LIMB LENGTH DIFFERENCE: ICD-10-CM

## 2022-12-27 DIAGNOSIS — R29.3 POOR POSTURE: ICD-10-CM

## 2022-12-27 DIAGNOSIS — M41.9 SCOLIOSIS, UNSPECIFIED SCOLIOSIS TYPE, UNSPECIFIED SPINAL REGION: Primary | ICD-10-CM

## 2022-12-27 NOTE — TELEPHONE ENCOUNTER
Jamaica Boles @ [facility] called  To inform us patient missed therapy appointment last week and needs new order to see patient  1x a week for 2 weeks

## 2023-01-05 ENCOUNTER — MED REC SCAN ONLY (OUTPATIENT)
Dept: INTERNAL MEDICINE CLINIC | Facility: CLINIC | Age: 38
End: 2023-01-05

## 2023-01-31 ENCOUNTER — TELEPHONE (OUTPATIENT)
Dept: INTERNAL MEDICINE CLINIC | Facility: CLINIC | Age: 38
End: 2023-01-31

## 2023-07-18 ENCOUNTER — OFFICE VISIT (OUTPATIENT)
Dept: INTERNAL MEDICINE CLINIC | Facility: CLINIC | Age: 38
End: 2023-07-18
Payer: COMMERCIAL

## 2023-07-18 VITALS
DIASTOLIC BLOOD PRESSURE: 80 MMHG | HEIGHT: 64 IN | BODY MASS INDEX: 17 KG/M2 | OXYGEN SATURATION: 98 % | RESPIRATION RATE: 14 BRPM | HEART RATE: 106 BPM | SYSTOLIC BLOOD PRESSURE: 118 MMHG

## 2023-07-18 DIAGNOSIS — G80.0 SPASTIC QUADRIPLEGIC CEREBRAL PALSY (HCC): ICD-10-CM

## 2023-07-18 DIAGNOSIS — K59.00 CONSTIPATION, UNSPECIFIED CONSTIPATION TYPE: Primary | ICD-10-CM

## 2023-07-18 PROCEDURE — 3079F DIAST BP 80-89 MM HG: CPT | Performed by: NURSE PRACTITIONER

## 2023-07-18 PROCEDURE — 99214 OFFICE O/P EST MOD 30 MIN: CPT | Performed by: NURSE PRACTITIONER

## 2023-07-18 PROCEDURE — 3008F BODY MASS INDEX DOCD: CPT | Performed by: NURSE PRACTITIONER

## 2023-07-18 PROCEDURE — 3074F SYST BP LT 130 MM HG: CPT | Performed by: NURSE PRACTITIONER

## 2023-07-18 RX ORDER — POLYETHYLENE GLYCOL 3350 17 G/17G
17 POWDER, FOR SOLUTION ORAL
COMMUNITY

## 2023-07-18 RX ORDER — ENEMA 19; 7 G/133ML; G/133ML
1 ENEMA RECTAL AS NEEDED
COMMUNITY

## 2023-07-18 NOTE — PATIENT INSTRUCTIONS
Continue the Miralax daily. You may take it twice daily if needed. Start daily fiber supplement such as Citrucel, Benefiber, or Metamucil as well. Do prune punch daily as needed for constipation. (4oz of prune juice, 4 oz orange juice heated in microwave with once dose of milk of magnesia added to it). If no improvement in constipation may need suppository or enema over the counter. Drink at least 64-80 oz of fluids today. Eat a high fiber diet. If constipation persists or worsens despite these interventions, notify us so we can place the referral to stomach doctor.

## 2023-09-15 ENCOUNTER — TELEPHONE (OUTPATIENT)
Dept: INTERNAL MEDICINE CLINIC | Facility: CLINIC | Age: 38
End: 2023-09-15

## 2023-09-15 DIAGNOSIS — G80.0 SPASTIC QUADRIPLEGIC CEREBRAL PALSY (HCC): Primary | ICD-10-CM

## 2023-10-21 ENCOUNTER — TELEPHONE (OUTPATIENT)
Dept: INTERNAL MEDICINE CLINIC | Facility: CLINIC | Age: 38
End: 2023-10-21

## 2023-10-21 NOTE — TELEPHONE ENCOUNTER
Incoming (mail or fax):   Fax  Received from:  [facility]  Documentation given to:  Triage

## 2023-10-24 NOTE — TELEPHONE ENCOUNTER
Spoke to mom Dago Granados on HIPAA, she advised pt is not currently in [address] [place] [address] or getting PT currently, going there to wheelchair clinic for new power wheelchair. Spoke with Jenn Coreas, looks like this is PT eval for wheelchair order. Order signed and faxed back to [facility], fax confirmed and sent for Betty Gregory informed and verbalized understanding.

## 2023-11-14 DIAGNOSIS — Z13.0 SCREENING FOR BLOOD DISEASE: ICD-10-CM

## 2023-11-14 DIAGNOSIS — Z00.00 ENCOUNTER FOR ANNUAL PHYSICAL EXAM: ICD-10-CM

## 2023-11-14 DIAGNOSIS — M62.838 MUSCLE SPASM: ICD-10-CM

## 2023-11-14 DIAGNOSIS — Z13.220 SCREENING FOR CHOLESTEROL LEVEL: ICD-10-CM

## 2023-11-14 DIAGNOSIS — Z13.228 SCREENING FOR METABOLIC DISORDER: ICD-10-CM

## 2023-11-14 DIAGNOSIS — Z13.29 SCREENING FOR THYROID DISORDER: Primary | ICD-10-CM

## 2023-11-14 RX ORDER — BACLOFEN 10 MG/1
5 TABLET ORAL NIGHTLY
Qty: 45 TABLET | Refills: 0 | Status: SHIPPED | OUTPATIENT
Start: 2023-11-14

## 2023-11-14 NOTE — TELEPHONE ENCOUNTER
Last OV relevant to medication: 12/7/22  Last refill date: 12/7/23 #45/refills: 0  When pt was asked to return for OV: 12/7/23  Upcoming appt/reason:   Future Appointments   Date Time Provider Dirk Ku   12/11/2023  2:00 PM Amee Berry APRN [facility]   Was pt informed of any over due labs:outstanding vitamin D, please advise if any others needed prior to apt

## 2023-11-14 NOTE — TELEPHONE ENCOUNTER
Lab for D was due earlier this year is also now almost due for full set. Okay to order the same ones that were done last year and have her do them all at once before her annual. Thanks.

## 2023-11-14 NOTE — TELEPHONE ENCOUNTER
Is this medication prescribed by the [facility] Providers? Yes    Did the patient contact the pharmacy directly?:  Yes    Is patient out of meds or supply very low?:  Yes, out for 2 weeks    Medication Requested:  baclofen     Dose:  10 MG Oral Tab     Is patient requesting a 30 or 90 day supply?:  90    Pharmacy name and phone # or location:  [facility] [address], [facility], [phone], [phone]     Is the patient due for an appointment?: Yes, appt made  (if so, please schedule appt)    Additional Notes:      Please advise the patient refills take up to 72 business hours.    ==================================        Pt has made an appt for a physical on 12/11/23 and needs blood work put in before her visit.

## 2023-12-11 ENCOUNTER — OFFICE VISIT (OUTPATIENT)
Dept: INTERNAL MEDICINE CLINIC | Facility: CLINIC | Age: 38
End: 2023-12-11
Payer: COMMERCIAL

## 2023-12-11 VITALS
HEART RATE: 104 BPM | WEIGHT: 75 LBS | HEIGHT: 64 IN | BODY MASS INDEX: 12.8 KG/M2 | RESPIRATION RATE: 14 BRPM | OXYGEN SATURATION: 97 % | DIASTOLIC BLOOD PRESSURE: 80 MMHG | TEMPERATURE: 97 F | SYSTOLIC BLOOD PRESSURE: 116 MMHG

## 2023-12-11 DIAGNOSIS — Z00.00 ENCOUNTER FOR ANNUAL PHYSICAL EXAM: Primary | ICD-10-CM

## 2023-12-11 DIAGNOSIS — E55.9 VITAMIN D DEFICIENCY: ICD-10-CM

## 2023-12-11 DIAGNOSIS — M62.838 MUSCLE SPASM: ICD-10-CM

## 2023-12-11 DIAGNOSIS — Z12.39 ENCOUNTER FOR BREAST CANCER SCREENING USING NON-MAMMOGRAM MODALITY: ICD-10-CM

## 2023-12-11 DIAGNOSIS — J30.9 ALLERGIC RHINITIS, UNSPECIFIED SEASONALITY, UNSPECIFIED TRIGGER: ICD-10-CM

## 2023-12-11 DIAGNOSIS — Z91.012 ALLERGY HISTORY, EGGS: ICD-10-CM

## 2023-12-11 DIAGNOSIS — G80.0 SPASTIC QUADRIPLEGIC CEREBRAL PALSY (HCC): ICD-10-CM

## 2023-12-11 DIAGNOSIS — R00.0 TACHYCARDIA: ICD-10-CM

## 2023-12-11 DIAGNOSIS — H00.14 CHALAZION OF LEFT UPPER EYELID: ICD-10-CM

## 2023-12-11 RX ORDER — BACLOFEN 10 MG/1
5 TABLET ORAL 2 TIMES DAILY
Qty: 90 TABLET | Refills: 3 | Status: SHIPPED | OUTPATIENT
Start: 2023-12-11

## 2023-12-11 RX ORDER — ERYTHROMYCIN 5 MG/G
1 OINTMENT OPHTHALMIC NIGHTLY
Qty: 1 EACH | Refills: 0 | Status: SHIPPED | OUTPATIENT
Start: 2023-12-11

## 2023-12-11 NOTE — PATIENT INSTRUCTIONS
Tdap vaccine    COVID vaccine recommended     Flu shot recommended. Get your labs done. You should be fasting for at least 10 hours. If you take a multivitamin with Biotin or any biotin product it should be held for 3 days prior to getting your labs done.      Get the echocardiogram     Get the breast ultrasound done if covered by insurance- CPT code 229053     Follow up in 1 year or sooner as needed

## 2024-01-02 ENCOUNTER — TELEPHONE (OUTPATIENT)
Dept: INTERNAL MEDICINE CLINIC | Facility: CLINIC | Age: 39
End: 2024-01-02

## 2024-01-02 NOTE — TELEPHONE ENCOUNTER
Incoming (mail or fax):  Fax  Received from:  [facility]  Documentation given to:  Ira Villagran

## 2024-01-03 ENCOUNTER — TELEMEDICINE (OUTPATIENT)
Dept: INTERNAL MEDICINE CLINIC | Facility: CLINIC | Age: 39
End: 2024-01-03
Payer: COMMERCIAL

## 2024-01-03 VITALS — BODY MASS INDEX: 13 KG/M2 | TEMPERATURE: 102 F | HEIGHT: 64 IN

## 2024-01-03 DIAGNOSIS — R68.89 FLU-LIKE SYMPTOMS: Primary | ICD-10-CM

## 2024-01-03 PROCEDURE — 99213 OFFICE O/P EST LOW 20 MIN: CPT | Performed by: STUDENT IN AN ORGANIZED HEALTH CARE EDUCATION/TRAINING PROGRAM

## 2024-01-03 NOTE — PROGRESS NOTES
Virtual Telephone Check-In    Colleen Cunningham verbally consents to a Virtual/Telephone Check-In visit on 01/03/24.  Patient has been referred to the [facility] website at [email or web address] to review the yearly Consent to Treat document.    Patient understands and accepts financial responsibility for any deductible, co-insurance and/or co-pays associated with this service.      [facility]    CHIEF COMPLAINT:    Chief Complaint   Patient presents with    Body ache and/or chills      Fever 102 and headache symptoms started yesterday was exposed to her sister that had the flu           HISTORY OF PRESENT ILLNESS:     Patient is a 39 yo with PMH of Spastic quadriplegic cerebral palsy. Patient has difficulties with communication and history obtained from patient's mother Daria.  On Sunday patient was exposed to her sister , who had symptoms of flu, however was not tested. On Tuesday patient started having symptoms initially with 101 fever , with body aches and headache, and some sore throat. Her mother gave lots of fluids, and Aspirin , which helped with fever and other symptoms. Fever came back today in am and was at 102.  They think that its flu and wanted to ask for the Flu medication.     Denies chills, SOB, cough, GI upset, problems with urination.      REVIEW OF SYSTEMS:  See HPI    Current Medications:    Current Outpatient Medications   Medication Sig Dispense Refill    baclofen 10 MG Oral Tab Take 0.5 tablets (5 mg total) by mouth 2 (two) times daily. 90 tablet 3       PAST MEDICAL, SOCIAL, AND FAMILY HISTORY:  Tobacco:    History   Smoking Status    Never   Smokeless Tobacco    Never       PHYSICAL EXAM:  Temp (!) 102 °F (38.9 °C)   Ht 5' 4\" (1.626 m)   LMP 12/23/2023 (Exact Date)   BMI 12.87 kg/m²      GENERAL:  No distress.   EYES:no scleral icterus.   LUNGS: no labored breathing, no dyspnea.     DATA:  Results for orders placed or performed in visit on 11/29/22   Comp Metabolic Panel (14)    Result Value Ref Range    Glucose 95 70 - 99 mg/dL    Sodium 140 136 - 145 mmol/L    Potassium 4.3 3.5 - 5.1 mmol/L    Chloride 108 98 - 112 mmol/L    CO2 27.0 21.0 - 32.0 mmol/L    Anion Gap 5 0 - 18 mmol/L    BUN 5 (L) 7 - 18 mg/dL    Creatinine 0.35 (L) 0.55 - 1.02 mg/dL    Calcium, Total 9.0 8.5 - 10.1 mg/dL    Calculated Osmolality 287 275 - 295 mOsm/kg    eGFR-Cr 135 >=60 mL/min/1.73m2    AST 16 15 - 37 U/L    ALT 28 13 - 56 U/L    Alkaline Phosphatase 64 37 - 98 U/L    Bilirubin, Total 0.4 0.1 - 2.0 mg/dL    Total Protein 7.7 6.4 - 8.2 g/dL    Albumin 3.9 3.4 - 5.0 g/dL    Globulin  3.8 2.8 - 4.4 g/dL    A/G Ratio 1.0 1.0 - 2.0    Patient Fasting for CMP? Yes    Lipid Panel   Result Value Ref Range    Cholesterol, Total 155 <200 mg/dL    HDL Cholesterol 55 40 - 59 mg/dL    Triglycerides 56 30 - 149 mg/dL    LDL Cholesterol 88 <100 mg/dL    VLDL 9 0 - 30 mg/dL    Non HDL Chol 100 <130 mg/dL    Patient Fasting for Lipid? Yes    TSH W Reflex To Free T4   Result Value Ref Range    TSH 1.790 0.358 - 3.740 mIU/mL   Vitamin D, 25-Hydroxy   Result Value Ref Range    Vitamin D, 25OH, Total 8.7 (L) 30.0 - 100.0 ng/mL   CBC W/ DIFFERENTIAL   Result Value Ref Range    WBC 5.1 4.0 - 11.0 x10(3) uL    RBC 4.51 3.80 - 5.30 x10(6)uL    HGB 13.4 12.0 - 16.0 g/dL    HCT 41.2 35.0 - 48.0 %    .0 150.0 - 450.0 10(3)uL    MCV 91.4 80.0 - 100.0 fL    MCH 29.7 26.0 - 34.0 pg    MCHC 32.5 31.0 - 37.0 g/dL    RDW 12.3 %    Neutrophil Absolute Prelim 3.35 1.50 - 7.70 x10 (3) uL    Neutrophil Absolute 3.35 1.50 - 7.70 x10(3) uL    Lymphocyte Absolute 1.07 1.00 - 4.00 x10(3) uL    Monocyte Absolute 0.61 0.10 - 1.00 x10(3) uL    Eosinophil Absolute 0.02 0.00 - 0.70 x10(3) uL    Basophil Absolute 0.02 0.00 - 0.20 x10(3) uL    Immature Granulocyte Absolute 0.01 0.00 - 1.00 x10(3) uL    Neutrophil % 65.9 %    Lymphocyte % 21.1 %    Monocyte % 12.0 %    Eosinophil % 0.4 %    Basophil % 0.4 %    Immature Granulocyte % 0.2 %         ASSESSMENT AND PLAN:    1. Flu-like symptoms  Patient with flu like symptoms for the past 2 days. She was not tested for COVID at home. Her sister, who was sick recently also was not checked or tested. I feel that we need to test patient for Flu/COVID/RSV to determine the etiology. Treatment will depend on the results of the testing.  - SARS-CoV-2/Flu A and B/RSV by PCR ([facility]) [E]; Future  - Can take Tylenol 500 mg every 6 hours scheduled to prevent fevers from rising  - Can add up to 400 mg Ibuprofen as needed emilio 8 hours  - Flonase for congestion  - Mucinex and robitussin OTC if develops cough        Pt understands phone/video evaluation is not a substitute for face to face examination or emergency care. Pt advised to go to the ER or call 911 for worsening symptoms or acute distress.      Please note that the following visit was completed using two-way, real-time interactive audio and/or video communication.  This has been done in good tamica to provide continuity of care in the best interest of the provider-patient relationship, due to the ongoing public health crisis/national emergency and because of restrictions of visitation.  There are limitations of this visit as no physical exam could be performed.  Every conscious effort was taken to allow for sufficient and adequate time.  This billing was spent on reviewing labs, medications, radiology tests and decision making.  Appropriate medical decision-making and tests are ordered as detailed in the plan of care above.     No follow-ups on file.      Penny Dahl MD

## 2024-01-04 ENCOUNTER — LAB ENCOUNTER (OUTPATIENT)
Dept: LAB | Facility: HOSPITAL | Age: 39
End: 2024-01-04
Attending: STUDENT IN AN ORGANIZED HEALTH CARE EDUCATION/TRAINING PROGRAM
Payer: COMMERCIAL

## 2024-01-04 DIAGNOSIS — R68.89 FLU-LIKE SYMPTOMS: ICD-10-CM

## 2024-01-04 PROCEDURE — 87637 SARSCOV2&INF A&B&RSV AMP PRB: CPT

## 2024-01-05 ENCOUNTER — TELEPHONE (OUTPATIENT)
Dept: INTERNAL MEDICINE CLINIC | Facility: CLINIC | Age: 39
End: 2024-01-05

## 2024-01-05 DIAGNOSIS — U07.1 COVID-19: Primary | ICD-10-CM

## 2024-01-05 LAB
FLUAV + FLUBV RNA SPEC NAA+PROBE: NOT DETECTED
FLUAV + FLUBV RNA SPEC NAA+PROBE: NOT DETECTED
RSV RNA SPEC NAA+PROBE: NOT DETECTED
SARS-COV-2 RNA RESP QL NAA+PROBE: DETECTED

## 2024-01-05 NOTE — TELEPHONE ENCOUNTER
Patient's mother Daria would like the results from the test done yesterday at the hospital that was ordered by Dr Soto.    Daria can be reached at: [phone]

## 2024-01-05 NOTE — PROGRESS NOTES
Results discussed with patient over the phone. COVID positive.   Today is day 4 of symptoms.    # COVID 19  - Will start - molnupiravir 200 MG Oral capsul - Take 800 mg by mouth every 12 (twelve) hours for 5 days., Normal, Disp-40 capsule, R-0

## 2024-01-05 NOTE — TELEPHONE ENCOUNTER
Noted, still in process.   [] : septum deviated bilaterally [Midline] : trachea located in midline position [Normal] : no rashes [de-identified] : bilat wax [Nasal Endoscopy Performed] : nasal endoscopy was performed, see procedure section for findings

## 2024-02-15 ENCOUNTER — OFFICE VISIT (OUTPATIENT)
Dept: INTERNAL MEDICINE CLINIC | Facility: CLINIC | Age: 39
End: 2024-02-15
Payer: COMMERCIAL

## 2024-02-15 VITALS
DIASTOLIC BLOOD PRESSURE: 68 MMHG | OXYGEN SATURATION: 98 % | SYSTOLIC BLOOD PRESSURE: 120 MMHG | BODY MASS INDEX: 13 KG/M2 | HEART RATE: 94 BPM | TEMPERATURE: 96 F | HEIGHT: 64 IN

## 2024-02-15 DIAGNOSIS — H00.14 CHALAZION OF LEFT UPPER EYELID: Primary | ICD-10-CM

## 2024-02-15 PROCEDURE — 99213 OFFICE O/P EST LOW 20 MIN: CPT | Performed by: STUDENT IN AN ORGANIZED HEALTH CARE EDUCATION/TRAINING PROGRAM

## 2024-02-15 NOTE — PATIENT INSTRUCTIONS
Please apply warm compresses 4 times a day for 10-15 minutes at a given time. Gently massage the chalazion. If not better by the end of next week, please call for ophthalmology referral. If develop fever, eye pain, increased redness, swelling, please call for further direction.

## 2024-02-15 NOTE — PROGRESS NOTES
Chief Complaint   Patient presents with    Bump/lump On Eyelid     Left eye since Sunday no drops or ointments just warm compress had this before      SUBJECTIVE & A/P:  The patient is a 38 year old female with pmhx of spastic quadriplegic cerebral palsy, scoliosis, and allergic rhinitis who presents with swollen eye lid.    //L upper eyelid chalazion   Since Sunday, noting swollen L upper eyelid. No pain. Does have history of itchy eyes. Denies blurry vision. Some redness noted on the upper eye lid. Also doing warm compresses once a day. Similar episode in December when she saw jesus for physical.   PLAN:   Review of notes, similar presentation in December as well. At this time recommend warm compresses with light massage. If not improving, refer to optho. If symptoms resolve and has a repeat occurrence in same area, recommend ophthalmology referral for further management.   -Recommend warm compresses 4 times a day with massage. Avoid trying to pop the lesion as this can make it worse.   -If not improving by the end of next week, recommend ophthalmology referral for potential incision and drainage.     Return if symptoms worsen or fail to improve.    Sarah Morales MD  Internal Medicine     Past Medical History:   Past Medical History:   Diagnosis Date    Allergic rhinitis 7/14/2009    Allergy history, eggs     Bleeding mole 11/2009    L face    Foot callus 6/2008    R foot    Neck pain on left side 7/2008    Otitis 3/04,9/05    L    Pharyngitis 3/04,4/06,5/06    Scoliosis     severe    Spastic quadriplegic cerebral palsy (HCC) 1984    wheel chair dependent    URI (upper respiratory infection) 11/06    UTI (urinary tract infection) 1/19/2010        Past Surgical History:   Past Surgical History:   Procedure Laterality Date    DENTAL SURGERY PROCEDURE      x2 since birth    OTHER SURGICAL HISTORY  1989    spinal fusion from thoracic spine on down-severe scoliosis    OTHER SURGICAL HISTORY  1988    bilat legs,  tendon  lengthening       Current Medications:    Current Outpatient Medications   Medication Sig Dispense Refill    baclofen 10 MG Oral Tab Take 0.5 tablets (5 mg total) by mouth 2 (two) times daily. 90 tablet 3       PHYSICAL EXAM:   /68 (BP Location: Right arm, Patient Position: Sitting, Cuff Size: adult)   Pulse 94   Temp (!) 96.3 °F (35.7 °C) (Temporal)   Ht 5' 4\" (1.626 m)   LMP 12/23/2023 (Exact Date)   SpO2 98%   BMI 12.87 kg/m²  Body mass index is 12.87 kg/m².   General: No acute distress. Alert and oriented x 3.  HEENT: L eye sclera without edema, erythema. L upper eyelid with evidence of overlying redness and swelling. Inner eye lid with evidence of head of the bump.   Psychiatric: Appropriate mood and affect.    LABS:   No results found for: \"A1C\"   Lab Results   Component Value Date    GLU 95 11/29/2022    BUN 5 (L) 11/29/2022    BUNCREA 15.9 09/21/2019    CREATSERUM 0.35 (L) 11/29/2022    ANIONGAP 5 11/29/2022    GFRNAA 132 09/21/2019    GFRAA 152 09/21/2019    CA 9.0 11/29/2022    OSMOCALC 287 11/29/2022    ALKPHO 64 11/29/2022    AST 16 11/29/2022    ALT 28 11/29/2022    BILT 0.4 11/29/2022    TP 7.7 11/29/2022    ALB 3.9 11/29/2022    GLOBULIN 3.8 11/29/2022     11/29/2022    K 4.3 11/29/2022     11/29/2022    CO2 27.0 11/29/2022      Lab Results   Component Value Date    WBC 5.1 11/29/2022    RBC 4.51 11/29/2022    HGB 13.4 11/29/2022    HCT 41.2 11/29/2022    MCV 91.4 11/29/2022    MCH 29.7 11/29/2022    MCHC 32.5 11/29/2022    RDW 12.3 11/29/2022    .0 11/29/2022        IMAGING: None to review.

## 2024-02-20 ENCOUNTER — TELEPHONE (OUTPATIENT)
Dept: INTERNAL MEDICINE CLINIC | Facility: CLINIC | Age: 39
End: 2024-02-20

## 2024-02-20 DIAGNOSIS — H00.14 CHALAZION OF LEFT UPPER EYELID: ICD-10-CM

## 2024-02-20 RX ORDER — ERYTHROMYCIN 5 MG/G
1 OINTMENT OPHTHALMIC NIGHTLY
Qty: 1 EACH | Refills: 0 | Status: SHIPPED | OUTPATIENT
Start: 2024-02-20

## 2024-02-20 NOTE — TELEPHONE ENCOUNTER
See message below. Spoke to mother on jennifer. Endorses about same when saw dr. Morales on 2/15/24. Denies fever, increased redness, or swelling. Does endorse some discomfort at night, patient sleeps on stomach. Asking providers recommendation? Opthalmology referral? Mother endorses insurance has change but seen dr. Trujillo  in past. Patient aware dr. Morales out of office. Please advise- thanks!

## 2024-02-20 NOTE — TELEPHONE ENCOUNTER
Mother of patients is calling to let Dr. Morales know that her daughter's eye is still the same.    She would like to know the eye doctor that Dr. Morales was going to recommend?    Please call Daria at work: [phone]

## 2024-02-20 NOTE — TELEPHONE ENCOUNTER
I sent an abt that was used previously. That can be started. Still should see eye doc. Okay to give referral for [facility]. If they are not in network they can check who is and if they need a different referral let us know. Thanks.

## 2024-02-27 ENCOUNTER — LAB ENCOUNTER (OUTPATIENT)
Dept: LAB | Facility: HOSPITAL | Age: 39
End: 2024-02-27
Attending: NURSE PRACTITIONER
Payer: COMMERCIAL

## 2024-02-27 DIAGNOSIS — E55.9 VITAMIN D DEFICIENCY: ICD-10-CM

## 2024-02-27 DIAGNOSIS — E87.5 HYPERKALEMIA: ICD-10-CM

## 2024-02-27 DIAGNOSIS — Z13.228 SCREENING FOR METABOLIC DISORDER: ICD-10-CM

## 2024-02-27 DIAGNOSIS — Z13.220 SCREENING FOR CHOLESTEROL LEVEL: ICD-10-CM

## 2024-02-27 DIAGNOSIS — Z00.00 ENCOUNTER FOR ANNUAL PHYSICAL EXAM: ICD-10-CM

## 2024-02-27 DIAGNOSIS — Z13.0 SCREENING FOR BLOOD DISEASE: ICD-10-CM

## 2024-02-27 DIAGNOSIS — Z13.29 SCREENING FOR THYROID DISORDER: ICD-10-CM

## 2024-02-27 LAB
ALBUMIN SERPL-MCNC: 4.3 G/DL (ref 3.4–5)
ALBUMIN/GLOB SERPL: 1.1 {RATIO} (ref 1–2)
ALP LIVER SERPL-CCNC: 63 U/L
ALT SERPL-CCNC: 17 U/L
ANION GAP SERPL CALC-SCNC: 2 MMOL/L (ref 0–18)
AST SERPL-CCNC: 17 U/L (ref 15–37)
BASOPHILS # BLD AUTO: 0.05 X10(3) UL (ref 0–0.2)
BASOPHILS NFR BLD AUTO: 0.8 %
BILIRUB SERPL-MCNC: 0.8 MG/DL (ref 0.1–2)
BUN BLD-MCNC: 8 MG/DL (ref 9–23)
CALCIUM BLD-MCNC: 9.7 MG/DL (ref 8.5–10.1)
CHLORIDE SERPL-SCNC: 111 MMOL/L (ref 98–112)
CHOLEST SERPL-MCNC: 165 MG/DL (ref ?–200)
CO2 SERPL-SCNC: 27 MMOL/L (ref 21–32)
CREAT BLD-MCNC: 0.55 MG/DL
EGFRCR SERPLBLD CKD-EPI 2021: 120 ML/MIN/1.73M2 (ref 60–?)
EOSINOPHIL # BLD AUTO: 0.14 X10(3) UL (ref 0–0.7)
EOSINOPHIL NFR BLD AUTO: 2.2 %
ERYTHROCYTE [DISTWIDTH] IN BLOOD BY AUTOMATED COUNT: 12.6 %
FASTING PATIENT LIPID ANSWER: YES
FASTING STATUS PATIENT QL REPORTED: YES
GLOBULIN PLAS-MCNC: 3.9 G/DL (ref 2.8–4.4)
GLUCOSE BLD-MCNC: 96 MG/DL (ref 70–99)
HCT VFR BLD AUTO: 42.9 %
HDLC SERPL-MCNC: 64 MG/DL (ref 40–59)
HGB BLD-MCNC: 14.1 G/DL
IMM GRANULOCYTES # BLD AUTO: 0.01 X10(3) UL (ref 0–1)
IMM GRANULOCYTES NFR BLD: 0.2 %
LDLC SERPL CALC-MCNC: 90 MG/DL (ref ?–100)
LYMPHOCYTES # BLD AUTO: 2.38 X10(3) UL (ref 1–4)
LYMPHOCYTES NFR BLD AUTO: 36.6 %
MCH RBC QN AUTO: 29.5 PG (ref 26–34)
MCHC RBC AUTO-ENTMCNC: 32.9 G/DL (ref 31–37)
MCV RBC AUTO: 89.7 FL
MONOCYTES # BLD AUTO: 0.68 X10(3) UL (ref 0.1–1)
MONOCYTES NFR BLD AUTO: 10.5 %
NEUTROPHILS # BLD AUTO: 3.24 X10 (3) UL (ref 1.5–7.7)
NEUTROPHILS # BLD AUTO: 3.24 X10(3) UL (ref 1.5–7.7)
NEUTROPHILS NFR BLD AUTO: 49.7 %
NONHDLC SERPL-MCNC: 101 MG/DL (ref ?–130)
OSMOLALITY SERPL CALC.SUM OF ELEC: 288 MOSM/KG (ref 275–295)
PLATELET # BLD AUTO: 225 10(3)UL (ref 150–450)
POTASSIUM SERPL-SCNC: 5.9 MMOL/L (ref 3.5–5.1)
PROT SERPL-MCNC: 8.2 G/DL (ref 6.4–8.2)
RBC # BLD AUTO: 4.78 X10(6)UL
SODIUM SERPL-SCNC: 140 MMOL/L (ref 136–145)
TRIGL SERPL-MCNC: 54 MG/DL (ref 30–149)
TSI SER-ACNC: 2.4 MIU/ML (ref 0.36–3.74)
VIT D+METAB SERPL-MCNC: 26 NG/ML (ref 30–100)
VLDLC SERPL CALC-MCNC: 9 MG/DL (ref 0–30)
WBC # BLD AUTO: 6.5 X10(3) UL (ref 4–11)

## 2024-02-27 PROCEDURE — 82306 VITAMIN D 25 HYDROXY: CPT

## 2024-02-27 PROCEDURE — 84443 ASSAY THYROID STIM HORMONE: CPT

## 2024-02-27 PROCEDURE — 80053 COMPREHEN METABOLIC PANEL: CPT

## 2024-02-27 PROCEDURE — 80061 LIPID PANEL: CPT

## 2024-02-27 PROCEDURE — 85025 COMPLETE CBC W/AUTO DIFF WBC: CPT

## 2024-02-27 PROCEDURE — 36415 COLL VENOUS BLD VENIPUNCTURE: CPT

## 2024-03-16 ENCOUNTER — LAB ENCOUNTER (OUTPATIENT)
Dept: LAB | Facility: HOSPITAL | Age: 39
End: 2024-03-16
Attending: NURSE PRACTITIONER
Payer: COMMERCIAL

## 2024-03-16 DIAGNOSIS — E87.5 HYPERKALEMIA: ICD-10-CM

## 2024-03-16 LAB — POTASSIUM SERPL-SCNC: 4.4 MMOL/L (ref 3.5–5.1)

## 2024-03-16 PROCEDURE — 36415 COLL VENOUS BLD VENIPUNCTURE: CPT

## 2024-03-16 PROCEDURE — 84132 ASSAY OF SERUM POTASSIUM: CPT

## 2024-06-13 ENCOUNTER — HOSPITAL ENCOUNTER (EMERGENCY)
Facility: HOSPITAL | Age: 39
Discharge: HOME | End: 2024-06-13
Attending: EMERGENCY MEDICINE

## 2024-06-13 VITALS
DIASTOLIC BLOOD PRESSURE: 71 MMHG | BODY MASS INDEX: 13.66 KG/M2 | WEIGHT: 80 LBS | OXYGEN SATURATION: 99 % | SYSTOLIC BLOOD PRESSURE: 107 MMHG | HEART RATE: 101 BPM | RESPIRATION RATE: 23 BRPM | HEIGHT: 64 IN | TEMPERATURE: 99 F

## 2024-06-13 DIAGNOSIS — R19.7 NAUSEA VOMITING AND DIARRHEA: Primary | ICD-10-CM

## 2024-06-13 DIAGNOSIS — R11.2 NAUSEA VOMITING AND DIARRHEA: Primary | ICD-10-CM

## 2024-06-13 DIAGNOSIS — E87.6 HYPOKALEMIA: ICD-10-CM

## 2024-06-13 LAB
ALBUMIN SERPL-MCNC: 4.1 G/DL (ref 3.4–5)
ALBUMIN/GLOB SERPL: 1 {RATIO} (ref 1–2)
ALP LIVER SERPL-CCNC: 66 U/L
ALT SERPL-CCNC: 16 U/L
ANION GAP SERPL CALC-SCNC: 8 MMOL/L (ref 0–18)
AST SERPL-CCNC: 16 U/L (ref 15–37)
BASOPHILS # BLD AUTO: 0.04 X10(3) UL (ref 0–0.2)
BASOPHILS NFR BLD AUTO: 0.4 %
BILIRUB SERPL-MCNC: 0.9 MG/DL (ref 0.1–2)
BUN BLD-MCNC: 9 MG/DL (ref 9–23)
C DIFF TOX B STL QL: NEGATIVE
CALCIUM BLD-MCNC: 9.1 MG/DL (ref 8.5–10.1)
CHLORIDE SERPL-SCNC: 104 MMOL/L (ref 98–112)
CO2 SERPL-SCNC: 25 MMOL/L (ref 21–32)
CREAT BLD-MCNC: 0.36 MG/DL
EGFRCR SERPLBLD CKD-EPI 2021: 132 ML/MIN/1.73M2 (ref 60–?)
EOSINOPHIL # BLD AUTO: 0.3 X10(3) UL (ref 0–0.7)
EOSINOPHIL NFR BLD AUTO: 2.9 %
ERYTHROCYTE [DISTWIDTH] IN BLOOD BY AUTOMATED COUNT: 12.5 %
GLOBULIN PLAS-MCNC: 4 G/DL (ref 2.8–4.4)
GLUCOSE BLD-MCNC: 93 MG/DL (ref 70–99)
HCT VFR BLD AUTO: 42.6 %
HGB BLD-MCNC: 14.6 G/DL
IMM GRANULOCYTES # BLD AUTO: 0.03 X10(3) UL (ref 0–1)
IMM GRANULOCYTES NFR BLD: 0.3 %
LIPASE SERPL-CCNC: 23 U/L (ref 13–75)
LYMPHOCYTES # BLD AUTO: 1.65 X10(3) UL (ref 1–4)
LYMPHOCYTES NFR BLD AUTO: 15.8 %
MCH RBC QN AUTO: 30 PG (ref 26–34)
MCHC RBC AUTO-ENTMCNC: 34.3 G/DL (ref 31–37)
MCV RBC AUTO: 87.5 FL
MONOCYTES # BLD AUTO: 1.19 X10(3) UL (ref 0.1–1)
MONOCYTES NFR BLD AUTO: 11.4 %
NEUTROPHILS # BLD AUTO: 7.25 X10 (3) UL (ref 1.5–7.7)
NEUTROPHILS # BLD AUTO: 7.25 X10(3) UL (ref 1.5–7.7)
NEUTROPHILS NFR BLD AUTO: 69.2 %
OSMOLALITY SERPL CALC.SUM OF ELEC: 282 MOSM/KG (ref 275–295)
PLATELET # BLD AUTO: 281 10(3)UL (ref 150–450)
POTASSIUM SERPL-SCNC: 3.4 MMOL/L (ref 3.5–5.1)
PROT SERPL-MCNC: 8.1 G/DL (ref 6.4–8.2)
RBC # BLD AUTO: 4.87 X10(6)UL
SODIUM SERPL-SCNC: 137 MMOL/L (ref 136–145)
WBC # BLD AUTO: 10.5 X10(3) UL (ref 4–11)

## 2024-06-13 PROCEDURE — 87507 IADNA-DNA/RNA PROBE TQ 12-25: CPT | Performed by: EMERGENCY MEDICINE

## 2024-06-13 PROCEDURE — 99284 EMERGENCY DEPT VISIT MOD MDM: CPT

## 2024-06-13 PROCEDURE — 85025 COMPLETE CBC W/AUTO DIFF WBC: CPT | Performed by: EMERGENCY MEDICINE

## 2024-06-13 PROCEDURE — 83690 ASSAY OF LIPASE: CPT | Performed by: EMERGENCY MEDICINE

## 2024-06-13 PROCEDURE — 87493 C DIFF AMPLIFIED PROBE: CPT | Performed by: EMERGENCY MEDICINE

## 2024-06-13 PROCEDURE — 96374 THER/PROPH/DIAG INJ IV PUSH: CPT

## 2024-06-13 PROCEDURE — 80053 COMPREHEN METABOLIC PANEL: CPT | Performed by: EMERGENCY MEDICINE

## 2024-06-13 RX ORDER — DIPHENOXYLATE HYDROCHLORIDE AND ATROPINE SULFATE 2.5; .025 MG/1; MG/1
1 TABLET ORAL ONCE
Status: COMPLETED | OUTPATIENT
Start: 2024-06-13 | End: 2024-06-13

## 2024-06-13 RX ORDER — DIPHENOXYLATE HYDROCHLORIDE AND ATROPINE SULFATE 2.5; .025 MG/1; MG/1
1-2 TABLET ORAL 4 TIMES DAILY PRN
Qty: 10 TABLET | Refills: 0 | Status: SHIPPED | OUTPATIENT
Start: 2024-06-13

## 2024-06-13 RX ORDER — POTASSIUM CHLORIDE 1.5 G/1.58G
20 POWDER, FOR SOLUTION ORAL ONCE
Status: COMPLETED | OUTPATIENT
Start: 2024-06-13 | End: 2024-06-13

## 2024-06-13 RX ORDER — ONDANSETRON 2 MG/ML
4 INJECTION INTRAMUSCULAR; INTRAVENOUS ONCE
Status: COMPLETED | OUTPATIENT
Start: 2024-06-13 | End: 2024-06-13

## 2024-06-13 RX ORDER — ONDANSETRON 4 MG/1
4 TABLET, ORALLY DISINTEGRATING ORAL EVERY 4 HOURS PRN
Qty: 10 EACH | Refills: 0 | Status: SHIPPED | OUTPATIENT
Start: 2024-06-13 | End: 2024-06-20

## 2024-06-13 NOTE — ED INITIAL ASSESSMENT (HPI)
Patient has had 8 episodes of diarrhea in the last 24 hours  Patient  has throw up once today. Poor appetite for the last couple days

## 2024-06-14 LAB
ADENOVIRUS F 40/41 PCR: NEGATIVE
ASTROVIRUS PCR: NEGATIVE
C CAYETANENSIS DNA SPEC QL NAA+PROBE: NEGATIVE
CAMPY SP DNA.DIARRHEA STL QL NAA+PROBE: NEGATIVE
CRYPTOSP DNA SPEC QL NAA+PROBE: NEGATIVE
EAEC PAA PLAS AGGR+AATA ST NAA+NON-PRB: NEGATIVE
EC STX1+STX2 + H7 FLIC SPEC NAA+PROBE: NEGATIVE
ENTAMOEBA HISTOLYTICA PCR: NEGATIVE
EPEC EAE GENE STL QL NAA+NON-PROBE: NEGATIVE
ETEC LTA+ST1A+ST1B TOX ST NAA+NON-PROBE: NEGATIVE
GIARDIA LAMBLIA PCR: NEGATIVE
NOROVIRUS GI/GII PCR: NEGATIVE
P SHIGELLOIDES DNA STL QL NAA+PROBE: NEGATIVE
ROTAVIRUS A PCR: POSITIVE
SALMONELLA DNA SPEC QL NAA+PROBE: NEGATIVE
SAPOVIRUS PCR: NEGATIVE
SHIGELLA SP+EIEC IPAH ST NAA+NON-PROBE: NEGATIVE
V CHOLERAE DNA SPEC QL NAA+PROBE: NEGATIVE
VIBRIO DNA SPEC NAA+PROBE: NEGATIVE
YERSINIA DNA SPEC NAA+PROBE: POSITIVE

## 2024-06-14 NOTE — PROGRESS NOTES
ED Culture Callback Results Review    Pharmacist reviewed culture results from ED visit .    Final stool panel positive for Rotavirus + Yersinia Enterocolitica. Supportive care only recommended at this time.     The caregiver was contacted by phone and informed of the results. Education was provided regarding symptom management, hand hygiene, and return precautions. All additional questions were answered. No further intervention required at this time.    Marina Gonzalez, Glenn  Emergency Medicine Pharmacist Specialist  06/14/24; 12:34 PM

## 2024-06-14 NOTE — ED PROVIDER NOTES
Patient Seen in: [facility] Emergency Department      History     Chief Complaint   Patient presents with    Abdomen/Flank Pain    Nausea/Vomiting/Diarrhea     Stated Complaint: Diarrhea, vomiting, and abd pain    Subjective:   HPI    ***    Objective:   Past Medical History:    Allergic rhinitis    Allergy history, eggs    Bleeding mole    L face    Foot callus    R foot    Neck pain on left side    Otitis    L    Pharyngitis    Scoliosis    severe    Spastic quadriplegic cerebral palsy (HCC)    wheel chair dependent    URI (upper respiratory infection)    UTI (urinary tract infection)              Past Surgical History:   Procedure Laterality Date    Dental surgery procedure      x2 since birth    Other surgical history  1989    spinal fusion from thoracic spine on down-severe scoliosis    Other surgical history  1988    bilat legs,  tendon lengthening                Social History     Socioeconomic History    Marital status: Single   Tobacco Use    Smoking status: Never     Passive exposure: Never    Smokeless tobacco: Never   Vaping Use    Vaping status: Never Used   Substance and Sexual Activity    Alcohol use: No    Drug use: No   Other Topics Concern    Caffeine Concern Yes    Stress Concern No    Weight Concern No    Special Diet No    Exercise No    Seat Belt Yes              Review of Systems    Positive for stated complaint: Diarrhea, vomiting, and abd pain  Other systems are as noted in HPI.  Constitutional and vital signs reviewed.      All other systems reviewed and negative except as noted above.    Physical Exam     ED Triage Vitals   BP 06/13/24 1717 113/78   Pulse 06/13/24 1717 105   Resp 06/13/24 1717 18   Temp 06/13/24 1717 98.8 °F (37.1 °C)   Temp src 06/13/24 1717 Temporal   SpO2 06/13/24 1717 99 %   O2 Device 06/13/24 1828 None (Room air)       Current Vitals:   Vital Signs  BP: 99/71  Pulse: 105  Resp: 17  Temp: 98.8 °F (37.1 °C)  Temp src: Temporal  MAP (mmHg): 81    Oxygen  Therapy  SpO2: 97 %  O2 Device: None (Room air)            Physical Exam    ***      ED Course     Labs Reviewed   COMP METABOLIC PANEL (14) - Abnormal; Notable for the following components:       Result Value    Potassium 3.4 (*)     Creatinine 0.36 (*)     All other components within normal limits   CBC W/ DIFFERENTIAL - Abnormal; Notable for the following components:    Monocyte Absolute 1.19 (*)     All other components within normal limits   LIPASE - Normal   C. DIFFICILE(TOXIGENIC)PCR - Normal   CBC WITH DIFFERENTIAL WITH PLATELET    Narrative:     The following orders were created for panel order CBC With Differential With Platelet.  Procedure                               Abnormality         Status                     ---------                               -----------         ------                     CBC W/ DIFFERENTIAL[[id number]]          Abnormal            Final result                 Please view results for these tests on the individual orders.   URINALYSIS, ROUTINE   RAINBOW DRAW LAVENDER   RAINBOW DRAW LIGHT GREEN   GI STOOL PANEL BY PCR             ***         MDM      ***                                   MDM    Disposition and Plan     Clinical Impression:  No diagnosis found.     Disposition:  There is no disposition on file for this visit.  There is no disposition time on file for this visit.    Follow-up:  No follow-up provider specified.        Medications Prescribed:  Current Discharge Medication List                                          W/ DIFFERENTIAL[[id number]]          Abnormal            Final result                 Please view results for these tests on the individual orders.   RAINBOW DRAW LAVENDER   RAINBOW DRAW LIGHT GREEN                      MDM      Medical Decision Making:    Differential diagnosis before testing includes most likely viral diarrhea versus bacterial diarrhea potential life threatening diagnosis which is a medical condition that poses a threat to life/function.     Comorbidities that add complexity to management: See HPI    I reviewed prior external ED notes including spastic quadriplegic CP      Shared decision making:    Potassium 3.4.  This was replaced with p.o. potassium.  Given IV fluids.  C. difficile negative.  Mom eager to DC home, told that we will call her if GI PCR stool panel shows to be bacterial diarrhea that would require antibiotics.  Given prescription for Zofran and Lomotil.  Told to take them Imodium first, and if this does not work then try Lomotil.  Warned of constipation possibility from Lomotil.                               Medical Decision Making      Disposition and Plan     Clinical Impression:  1. Nausea vomiting and diarrhea    2. Hypokalemia         Disposition:  Discharge  6/13/2024  9:27 pm    Follow-up:  Penny Dahl MD  [address] [id number] [phone]    Follow up in 2 day(s)            Medications Prescribed:  Discharge Medication List as of 6/13/2024  9:36 PM        START taking these medications    Details   ondansetron 4 MG Oral Tablet Dispersible Take 1 tablet (4 mg total) by mouth every 4 (four) hours as needed for Nausea., Normal, Disp-10 each, R-0      diphenoxylate-atropine 2.5-0.025 MG Oral Tab Take 1-2 tablets by mouth 4 (four) times daily as needed for Diarrhea., Normal, Disp-10 tablet, R-0

## 2024-06-21 ENCOUNTER — TELEPHONE (OUTPATIENT)
Dept: INTERNAL MEDICINE CLINIC | Facility: CLINIC | Age: 39
End: 2024-06-21

## 2024-06-21 DIAGNOSIS — S31.000A WOUND OF SACRAL REGION, INITIAL ENCOUNTER: Primary | ICD-10-CM

## 2024-06-21 NOTE — TELEPHONE ENCOUNTER
Mom called and said that patient has a non healing sore on her bottom. Mom would like a referral to the wound clinic.. Has been to wound before and needing referral to go to see them. Prefers not to come in, real difficult to bring her in to be looked at.

## 2024-06-26 NOTE — TELEPHONE ENCOUNTER
Daria informed of referral and verbalized understanding. She states Colleen is doing okay, wound is dime sized and no signs of infection, some loose stools previously so they would like wound recs.

## 2024-06-27 ENCOUNTER — TELEMEDICINE (OUTPATIENT)
Dept: INTERNAL MEDICINE CLINIC | Facility: CLINIC | Age: 39
End: 2024-06-27

## 2024-06-27 ENCOUNTER — TELEPHONE (OUTPATIENT)
Dept: INTERNAL MEDICINE CLINIC | Facility: CLINIC | Age: 39
End: 2024-06-27

## 2024-06-27 DIAGNOSIS — L89.312 PRESSURE INJURY OF RIGHT BUTTOCK, STAGE 2 (HCC): Primary | ICD-10-CM

## 2024-06-27 PROCEDURE — 99214 OFFICE O/P EST MOD 30 MIN: CPT | Performed by: NURSE PRACTITIONER

## 2024-06-27 RX ORDER — GEL DRESSING 2" X 2"
1 BANDAGE TOPICAL AS NEEDED
Qty: 30 EACH | Refills: 0 | Status: SHIPPED | OUTPATIENT
Start: 2024-06-27

## 2024-06-27 NOTE — TELEPHONE ENCOUNTER
Can she take a picture of the wound and send it through [facility] and do a VV with me sometime soon by early next week the latest so I can guide them? Thanks.

## 2024-06-27 NOTE — TELEPHONE ENCOUNTER
Per mom Daria (on HIPAA),  patient can not get an appointment with the wound clinic until July 11th.  The wound is not getting bigger but it does burn.  Would Ira be able to suggest a medication she can take?  Daria mentioned Santyl.  Patient's pharmacy is [id number] [address]. [phone], [phone] [[id number]] .  Please advise - mom can be reached at [phone].  Thank you!

## 2024-06-27 NOTE — TELEPHONE ENCOUNTER
Incoming (mail or fax):  fax  Received from:  [facility]  Documentation given to:  Triage incoming    Alternative requested -- medihoney

## 2024-06-27 NOTE — TELEPHONE ENCOUNTER
Spoke to sister on jennifer. Aware. Verbalizes understanding.    Scheduled for appoinment. Transferred to  for mychart access.    Future Appointments   Date Time Provider Department Center   6/27/2024 12:40 PM Amee Berry APRN [facility] To   7/11/2024  8:30 AM Carol Ann Chacko APRN [facility]

## 2024-06-28 RX ORDER — HONEY 100 %
1 PASTE (ML) TOPICAL
Qty: 44 ML | Refills: 0 | Status: SHIPPED | OUTPATIENT
Start: 2024-06-28 | End: 2024-06-28

## 2024-06-28 RX ORDER — HONEY 100 %
1 PASTE (ML) TOPICAL
Qty: 44 ML | Refills: 0 | Status: SHIPPED | OUTPATIENT
Start: 2024-06-28

## 2024-06-28 NOTE — TELEPHONE ENCOUNTER
Ira advised to do mepilex dressing and medihoney. The order placed is for medihoney dressings, not mepilex. This may be why insurance isn't covering and has to specialty order as it typically comes as gel/paste. Spoke to [facility], they unfortunately do not have the medihoney pads either and would have to order, they do however have medihoney paste.     Spoke to sister Gabrielle on SUDEEP, she understands this and is okay with using medihoney paste and mepilex dressings. Orders sent to [facility] and advised that there are otc options as well. Checked with [facility] but they don't have this in stock.

## 2024-06-28 NOTE — TELEPHONE ENCOUNTER
The patient has a pressure ulcer and needs mepilex dressings. [email or web address] be [facility] by [facility].    Also check with pharmacy to see if the medihoney was available. If not get it from [facility] if possible. Thanks.

## 2024-06-28 NOTE — PROGRESS NOTES
Virtual video Check-In    Colleen Cunningham verbally consents to a Virtual/video Check-In visit on 06/27/24.  Patient has been referred to the [facility] website at [email or web address] to review the yearly Consent to Treat document.    Patient understands and accepts financial responsibility for any deductible, co-insurance and/or co-pays associated with this service.    Duration of the service: 15 minutes      Colleen Cunningham is a 38 year old female.  CHIEF COMPLAINT   Pressure ulcer    HPI:   The patient has a history of cerebral palsy and is wheelchair-bound.  Her mother and sister take care of her.  They noticed a pressure ulcer and have been treating it at home. It seems like it is worsening. It does hurt.     Current Outpatient Medications   Medication Sig Dispense Refill           baclofen 10 MG Oral Tab Take 0.5 tablets (5 mg total) by mouth 2 (two) times daily. 90 tablet 3      Past Medical History:    Allergic rhinitis    Allergy history, eggs    Bleeding mole    L face    Foot callus    R foot    Neck pain on left side    Otitis    L    Pharyngitis    Scoliosis    severe    Spastic quadriplegic cerebral palsy (HCC)    wheel chair dependent    URI (upper respiratory infection)    UTI (urinary tract infection)      Social History:  Social History     Socioeconomic History    Marital status: Single   Tobacco Use    Smoking status: Never     Passive exposure: Never    Smokeless tobacco: Never   Vaping Use    Vaping status: Never Used   Substance and Sexual Activity    Alcohol use: No    Drug use: No   Other Topics Concern    Caffeine Concern Yes    Stress Concern No    Weight Concern No    Special Diet No    Exercise No    Seat Belt Yes        REVIEW OF SYSTEMS:   See HPI    EXAM:   Limited due to video visit  GENERAL: does not sound like they are in any acute distress on the video  LUNGS: They are able to phonate but not clearly without pausing due to sob, there was no coughing during the visit.  NEURO: Oriented  times three, in a WC    Right Buttock      LABS:      Lab Results   Component Value Date    WBC 10.5 06/13/2024    RBC 4.87 06/13/2024    HGB 14.6 06/13/2024    HCT 42.6 06/13/2024    MCV 87.5 06/13/2024    MCH 30.0 06/13/2024    MCHC 34.3 06/13/2024    RDW 12.5 06/13/2024    .0 06/13/2024      Lab Results   Component Value Date    GLU 93 06/13/2024    BUN 9 06/13/2024    BUNCREA 15.9 09/21/2019    CREATSERUM 0.36 (L) 06/13/2024    ANIONGAP 8 06/13/2024    GFRNAA 132 09/21/2019    GFRAA 152 09/21/2019    CA 9.1 06/13/2024    OSMOCALC 282 06/13/2024    ALKPHO 66 06/13/2024    AST 16 06/13/2024    ALT 16 06/13/2024    BILT 0.9 06/13/2024    TP 8.1 06/13/2024    ALB 4.1 06/13/2024    GLOBULIN 4.0 06/13/2024     06/13/2024    K 3.4 (L) 06/13/2024     06/13/2024    CO2 25.0 06/13/2024      Lab Results   Component Value Date    CHOLEST 165 02/27/2024    TRIG 54 02/27/2024    HDL 64 (H) 02/27/2024    LDL 90 02/27/2024    VLDL 9 02/27/2024    NONHDLC 101 02/27/2024      Lab Results   Component Value Date    TSH 2.400 02/27/2024      No results found for: \"EAG\", \"A1C\"     IMAGING:     No results found.     ASSESSMENT AND PLAN:   1. Pressure injury of right buttock, stage 2 (HCC)  -Patient has a pressure ulcer to the right buttock  -Cleanse the wound, start Medihoney and Mepilex dressing  -See wound care clinic as planned  - Wound Dressings (MEDIHONEY CA ALGINATE 2\"X2\") External Pads; Apply 1 each topically as needed.  Dispense: 30 each; Refill: 0  - DME - External     The patient indicates understanding of these issues and agrees to the plan.  The patient is asked to return  as needed or when routine care is due.         Please note that the following visit was completed using two-way, real-time interactive audio and/or video communication.  This has been done in good tamica to provide continuity of care in the best interest of the provider-patient relationship, due to the ongoing public health  crisis/national emergency and because of restrictions of visitation.  There are limitations of this visit as no physical exam could be performed.  Every conscious effort was taken to allow for sufficient and adequate time.  This billing was spent on reviewing labs, medications, radiology tests and decision making.  Appropriate medical decision-making and tests are ordered as detailed in the plan of care above.

## 2024-06-28 NOTE — PATIENT INSTRUCTIONS
Cleanse the wound thoroughly, start Medihoney and cover with Mepilex dressing    Get protein in the diet or do a protein supplement as well like Premier protein daily to support wound healing    Avoid pressure to the wound and rotate positions frequently    If the wounds looks like it becomes infected with redness, warmth, drainage, swelling, increased pain please send an updated picture to the office through [employer]    You may take Tylenol as needed for pain, you may use topical pain medications    Follow-up as needed or when routine care is due

## 2024-06-28 NOTE — TELEPHONE ENCOUNTER
Try yue's first. There are over the counter variations and on amazon as well if not able to find as yues

## 2024-06-28 NOTE — TELEPHONE ENCOUNTER
Order for mepilex faxed to [facility]. Rec confirmation.    Regarding medihoney:  Received fax from [employer] ([id number] [facility]) for alternative request. States \"not covered, cost is $400. Can MD change to tube of medihoney instead?\"  Called [facility] they would have to special order it. Not covered by patient insurance.    simon Roth mentioned trying [facility] below.  Please advise-thanks!

## 2024-07-10 NOTE — PROGRESS NOTES
CHIEF COMPLAINT:     Chief Complaint   Patient presents with    Wound Care     Pt arrives for wound care via wheelchair with mother. Says she is here for a wound on her bottom that has been painful.     HPI:   Information obtained from Patient/guardian and chart  7-11-24 INITIAL:  patient is a 37 yo female with hx of spastic quadriplegia cerebral palsy who presents with mom for a right buttocks pressure injury that started several months ago and it was not helpful when patient did have bout of n/v/d that was treated zofran and lomotil. Final stool panel was positive for rotavirus and yersinia enterocolitica-supportive care recommended.   The mom has been treating the wound with honey gel and mepilex as recommended by pcp.  Labs from June 2024 are unremarkable with a wnl albumin and total protein.  Patient does have a new wheelchair s/p seating eval with nu motion and the wc has a air/roho cushion for offloading however patient does not like the new chair as it is more difficult for her to use. Currently she is using her 21 yo wc with a gel cushion. Both chairs are a tilt in space. Patient sleeps in a regular bed on her stomach. She is incontinent of urine and stool, but lately has had constipation.  Patient is communicative, but dependent for all movement.  Her left hip is becoming less flexible which puts pressure on the right when she sits. I stressed to the patient several times the importance of offloading and getting used to her new chair.  The wound is very painful to touch. Will culture and start emperic topical. D/w mom that because of the area the culture will grow bacteria and will determine once we get the sensitiivites if patient needs a po abx.      MEDICATIONS:     Current Outpatient Medications:     gentamicin 0.1 % External Ointment, Apply 1 Application topically 2 (two) times daily for 14 days., Disp: 30 g, Rfl: 0    Wound Dressings (MEDIHONEY WOUND/BURN DRESSING) External Paste, Apply 1 Application  topically daily as needed., Disp: 44 mL, Rfl: 0    Wound Dressings (MEDIHONEY CA ALGINATE 2\"X2\") External Pads, Apply 1 each topically as needed., Disp: 30 each, Rfl: 0    baclofen 10 MG Oral Tab, Take 0.5 tablets (5 mg total) by mouth 2 (two) times daily., Disp: 90 tablet, Rfl: 3  ALLERGIES:   No Known Allergies   REVIEW OF SYSTEMS:   This information was obtained from the patient/family and chart.    See HPI for pertinent positives, otherwise 10 pt ROS negative.    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    URI (upper respiratory infection)    UTI (urinary tract infection)     Past Surgical History:   Procedure Laterality Date    Dental surgery procedure      x2 since birth    Other surgical history  1989    spinal fusion from thoracic spine on down-severe scoliosis    Other surgical history  1988    bilat legs,  tendon lengthening      Social History     Socioeconomic History    Marital status: Single   Tobacco Use    Smoking status: Never     Passive exposure: Never    Smokeless tobacco: Never   Vaping Use    Vaping status: Never Used   Substance and Sexual Activity    Alcohol use: No    Drug use: No   Other Topics Concern    Caffeine Concern Yes    Stress Concern No    Weight Concern No    Special Diet No    Exercise No    Seat Belt Yes     PHYSICAL EXAM:     Vitals:    07/11/24 0829   BP: 122/80   Pulse: (!) 134   Resp: 12   Temp: 97.8 °F (36.6 °C)   Weight: 90 lb (40.8 kg)      Estimated body mass index is 15.45 kg/m² as calculated from the following:    Height as of 6/13/24: 64\".    Weight as of this encounter: 90 lb (40.8 kg).   No results found for: \"PGLU\"    Vital signs reviewed.Appears stated age, well groomed.    Constitutional:  Bp wnl for patient. Pulse Regular  and tachy but wnl for patient (per mom). Respirations easy and unlabored. Temperature wnl. Patient is thin. Appearance neat and clean. Appears in no acute distress.    Musculoskeletal:  Patient is dependent for all transfers and mobility, contractures of upper extremities, hips.  Integumentary:  refer to wound characteristics and images   Psychiatric:  Judgment and insight intact of parent/guardian. Alert and oriented times 3. No evidence of depression, anxiety, or agitation. Calm, cooperative, and communicative.    DIAGNOSTICS:     Lab Results   Component Value Date    BUN 9 06/13/2024    CREATSERUM 0.36 (L) 06/13/2024    ALB 4.1 06/13/2024    TP 8.1 06/13/2024       WOUND ASSESSMENT:     Wound 07/11/24 #1 Ischium Right (Active)   Date First Assessed/Time First Assessed: 07/11/24 0834    Wound Number (Wound Clinic Only): #1  Primary Wound Type: Pressure Injury  Location: Ischium  Wound Location Orientation: Right      Assessments 7/11/2024  8:35 AM   Wound Image     Drainage Amount Scant   Drainage Description Serosanguineous   Wound Length (cm) 2 cm   Wound Width (cm) 2 cm   Wound Surface Area (cm^2) 4 cm^2   Wound Depth (cm) 0.1 cm   Wound Volume (cm^3) 0.4 cm^3   Margins Well-defined edges   Non-staged Wound Description Full thickness   Rika-wound Assessment Clean;Ecchymosis;Blanchable erythema   Wound Granulation Tissue Firm;Pink   Wound Bed Granulation (%) 40 %   Wound Bed Slough (%) 60 %   Wound Odor None   Tunneling? No   Undermining? No   Sinus Tracts? No   Pressure Injury Stage Stage 3       No associated orders.          ASSESSMENT AND PLAN:    1. Spastic quadriplegic cerebral palsy (HCC)  - Aerobic Bacterial Culture; Future    2. Pressure injury of right ischium, stage 3 (HCC)  - Aerobic Bacterial Culture; Future          Discussed with patient and mom the aspects of wound healing including:  blood flow in/out and how offloading pressure affects this, wound bed optimization including moist wound healing,  removal of necrosis, bioburden control, monitoring for infection, offloading and finally the patient as a whole including nutrition and increased protein intake    Risks, benefits, and alternatives of current treatment plan discussed in detail.  Questions and concerns addressed. Red flags to RTC or ED reviewed.  Patient (or parent) agrees to plan.      NOTE TO PATIENT: The 21st Century Cures Act makes clinical notes like these available to patients in the interest of transparency. Clinical notes are medical documents used by physicians and care providers to communicate with each other. These documents include medical language and terminology, abbreviations, and treatment information that may sound technical and at times possibly unfamiliar. In addition, at times, the verbiage may appear blunt or direct. These documents are one tool providers use to communicate relevant information and clinical opinions of the care providers in a way that allows common understanding of the clinical context.   Patient is new to clinic, pcp is shamir.  I spent   45   minutes with the patient. This time included:    preparing to see the patient (eg, review notes and recent diagnostics),  seeing the patient, obtaining and/or reviewing separately obtained history, performing a medically appropriate examination and/or evaluation, counseling and educating the patient, documenting in the record   DISCHARGE:      Patient Instructions   Please return:   2 WEEKS      Laboratory (blood draw) orders:  Orders Placed This Encounter   Procedures    Aerobic Bacterial Culture       Meds & Refills for this Visit:  Requested Prescriptions     Signed Prescriptions Disp Refills    gentamicin 0.1 % External Ointment 30 g 0     Sig: Apply 1 Application topically 2 (two) times daily for 14 days.     Patient discharge and wound care instructions  Colleen Cunningham  7/11/2024       You may shower and cleanse area with mild soap and water, dab dry with gauze and  apply your dressings as directed.     Changing your dressing:              TWICE DAILY  Wash your hands with soap and water.  Ensure that the old dressing is removed completely. Place it in a plastic bag and throw it in the trash.  Cleanse the wound with hypochlorous wound cleanser (ie. Anasept, vashe, pure and clean) .  It's ok to “scrub” your wound with the gauze, small amount of bleeding with cleansing is normal and ok.  Apply the following dressings:  APPLY GENTAMICIN>COVER WITH GAUZE AND TEGADERM       Offloading:  FRANCO! Try to sit more in your new chair    Off-loading is an important part of your wound treatment program. It is a part that only you can assure is accomplished. If you have any concerns about methods to off-load your wound, or feel that you might have trouble following the off-loading plan prescribed for you, talk to your doctor so that alternatives can be discussed that will work in your situation.    EHOB waffle cushion can be purchased online or through a pharmacy listed in your welcome folder. Please use at all times when sitting, even when transporting in the car if possible.     Nutrition and blood sugar control:  Focus on the following:  Protein: Meats, beans, eggs, milk and yogurt particularly Greek yogurt), tofu, soy nuts, soy protein products (Follow the protein handout in your welcome folder)  Vitamin C: Citrus fruits and juices, strawberries, tomatoes, tomato juice, peppers, baked potatoes, spinach, broccoli, cauliflower, [place] sprouts, cabbage  Vitamin A: Dark green, leafy vegetables, orange or yellow vegetables, cantaloupe, fortified dairy products, liver, fortified cereals  Zinc: Fortified cereals, red meats, seafood  Consider supplementing with Alber by [employer]. It can be purchased on amazon, [employer] website, or local pharmacy may be able to order it for you.  (These are essential branch chain amino acids that help with tissue building and wound healing).      Concerns:  Signs of infection may include the following:  Increase in redness  Red \"streaks\" from wound  Increase in swelling  Fever  Unusual odor  Change in the amount of wound drainage     Should you experience any significant changes in your wound(s) or have any questions regarding your home care instructions please contact the [facility] [place] @ [phone] If after regular business hours, please call your family doctor or local emergency room. The treatment plan has been discussed at length between you and your provider. Follow all instructions carefully, it is very important. If you do not follow all instructions you are at risk of your wound not healing, infection, possible loss of limb and even loss of life.    Carol Ann Chacko FNP-C, CWCN-AP, CFCN, CSWS, WCC, DWC  7/11/2024

## 2024-07-11 ENCOUNTER — OFFICE VISIT (OUTPATIENT)
Dept: WOUND CARE | Facility: HOSPITAL | Age: 39
End: 2024-07-11
Attending: NURSE PRACTITIONER
Payer: COMMERCIAL

## 2024-07-11 VITALS
HEART RATE: 134 BPM | RESPIRATION RATE: 12 BRPM | BODY MASS INDEX: 15 KG/M2 | DIASTOLIC BLOOD PRESSURE: 80 MMHG | WEIGHT: 90 LBS | TEMPERATURE: 98 F | SYSTOLIC BLOOD PRESSURE: 122 MMHG

## 2024-07-11 DIAGNOSIS — G80.0 SPASTIC QUADRIPLEGIC CEREBRAL PALSY (HCC): Primary | ICD-10-CM

## 2024-07-11 DIAGNOSIS — L89.313 PRESSURE INJURY OF RIGHT ISCHIUM, STAGE 3 (HCC): ICD-10-CM

## 2024-07-11 PROCEDURE — 99215 OFFICE O/P EST HI 40 MIN: CPT | Performed by: NURSE PRACTITIONER

## 2024-07-11 RX ORDER — GENTAMICIN SULFATE 1 MG/G
1 OINTMENT TOPICAL 2 TIMES DAILY
Qty: 30 G | Refills: 0 | Status: SHIPPED | OUTPATIENT
Start: 2024-07-11 | End: 2024-07-25

## 2024-07-11 NOTE — PATIENT INSTRUCTIONS
Please return:   2 WEEKS      Laboratory (blood draw) orders:  Orders Placed This Encounter   Procedures    Aerobic Bacterial Culture       Meds & Refills for this Visit:  Requested Prescriptions     Signed Prescriptions Disp Refills    gentamicin 0.1 % External Ointment 30 g 0     Sig: Apply 1 Application topically 2 (two) times daily for 14 days.     Patient discharge and wound care instructions  Colleen Cunningham  7/11/2024       You may shower and cleanse area with mild soap and water, dab dry with gauze and apply your dressings as directed.     Changing your dressing:              TWICE DAILY  Wash your hands with soap and water.  Ensure that the old dressing is removed completely. Place it in a plastic bag and throw it in the trash.  Cleanse the wound with hypochlorous wound cleanser (ie. Anasept, vashe, pure and clean) .  It's ok to “scrub” your wound with the gauze, small amount of bleeding with cleansing is normal and ok.  Apply the following dressings:  APPLY GENTAMICIN>COVER WITH GAUZE AND TEGADERM       Offloading:  COLLEEN! Try to sit more in your new chair    Off-loading is an important part of your wound treatment program. It is a part that only you can assure is accomplished. If you have any concerns about methods to off-load your wound, or feel that you might have trouble following the off-loading plan prescribed for you, talk to your doctor so that alternatives can be discussed that will work in your situation.    EHOB waffle cushion can be purchased online or through a pharmacy listed in your welcome folder. Please use at all times when sitting, even when transporting in the car if possible.     Nutrition and blood sugar control:  Focus on the following:  Protein: Meats, beans, eggs, milk and yogurt particularly Greek yogurt), tofu, soy nuts, soy protein products (Follow the protein handout in your welcome folder)  Vitamin C: Citrus fruits and juices, strawberries, tomatoes, tomato juice, peppers,  baked potatoes, spinach, broccoli, cauliflower, [place] sprouts, cabbage  Vitamin A: Dark green, leafy vegetables, orange or yellow vegetables, cantaloupe, fortified dairy products, liver, fortified cereals  Zinc: Fortified cereals, red meats, seafood  Consider supplementing with Alber by [employer]. It can be purchased on amazon, [employer] website, or local pharmacy may be able to order it for you.  (These are essential branch chain amino acids that help with tissue building and wound healing).     Concerns:  Signs of infection may include the following:  Increase in redness  Red \"streaks\" from wound  Increase in swelling  Fever  Unusual odor  Change in the amount of wound drainage     Should you experience any significant changes in your wound(s) or have any questions regarding your home care instructions please contact the [facility] @ [phone] If after regular business hours, please call your family doctor or local emergency room. The treatment plan has been discussed at length between you and your provider. Follow all instructions carefully, it is very important. If you do not follow all instructions you are at risk of your wound not healing, infection, possible loss of limb and even loss of life.

## 2024-07-11 NOTE — PROGRESS NOTES
.Weekly Wound Education Note    Teaching Provided To: Patient;Family  Training Topics: Dressing;Cleasing and general instructions;Discharge instructions;Off-loading  Training Method: Explain/Verbal;Written  Training Response: Patient responds and understands        Notes: Inital visit for right ishium wound. Wound cultured this visit. Start gentamicin, and bordered foam. Information provided regarding Alber and EHOB cushion.

## 2024-07-24 NOTE — PROGRESS NOTES
CHIEF COMPLAINT:     Chief Complaint   Patient presents with    Wound Care     Patient arrives for a wound care follow up appointment. Patient states she has no pain currently. Patient's wound has been dressed with a bandage and gentamicin appointment.      HPI:   Information obtained from Patient/guardian and chart  7-11-24 INITIAL:  patient is a 39 yo female with hx of spastic quadriplegia cerebral palsy who presents with mom for a right buttocks pressure injury that started several months ago and it was not helpful when patient did have bout of n/v/d that was treated zofran and lomotil. Final stool panel was positive for rotavirus and yersinia enterocolitica-supportive care recommended.   The mom has been treating the wound with honey gel and mepilex as recommended by pcp.  Labs from June 2024 are unremarkable with a wnl albumin and total protein.  Patient does have a new wheelchair s/p seating eval with nu motion and the wc has a air/roho cushion for offloading however patient does not like the new chair as it is more difficult for her to use. Currently she is using her 21 yo wc with a gel cushion. Both chairs are a tilt in space. Patient sleeps in a regular bed on her stomach. She is incontinent of urine and stool, but lately has had constipation.  Patient is communicative, but dependent for all movement.  Her left hip is becoming less flexible which puts pressure on the right when she sits. I stressed to the patient several times the importance of offloading and getting used to her new chair.  The wound is very painful to touch. Will culture and start emperic topical. D/w mom that because of the area the culture will grow bacteria and will determine once we get the sensitiivites if patient needs a po abx.      7-25-24 patient returns with mom and sister, culture was + for normal skin issac, they have been dressing with gentamicin topical. Patient's pain is improved. Patient has been using using the new chair  in the mornings, and then the old chair after she gets up from her nap.  They have another wheelchair appointment with [facility] next week to learn more about the chair. We discussed again pressure and pressure relief with tilting the chair.  We also discussed skin care/cleansing. Will transition to triad coloplast paste.    MEDICATIONS:     Current Outpatient Medications:     gentamicin 0.1 % External Ointment, Apply 1 Application topically 2 (two) times daily for 14 days., Disp: 30 g, Rfl: 0    Wound Dressings (MEDIHONEY WOUND/BURN DRESSING) External Paste, Apply 1 Application topically daily as needed., Disp: 44 mL, Rfl: 0    Wound Dressings (MEDIHONEY CA ALGINATE 2\"X2\") External Pads, Apply 1 each topically as needed., Disp: 30 each, Rfl: 0    baclofen 10 MG Oral Tab, Take 0.5 tablets (5 mg total) by mouth 2 (two) times daily., Disp: 90 tablet, Rfl: 3  ALLERGIES:   No Known Allergies   REVIEW OF SYSTEMS:   This information was obtained from the patient/family and chart.    See HPI for pertinent positives, otherwise 10 pt ROS negative.  HISTORY:   Past medical, surgical, family and social history updated where appropriate.  PHYSICAL EXAM:     Vitals:    07/25/24 0700   BP: 111/69   Pulse: (!) 125   Resp: 16   Temp: 98 °F (36.7 °C)        Estimated body mass index is 15.45 kg/m² as calculated from the following:    Height as of 6/13/24: 64\".    Weight as of 7/11/24: 90 lb (40.8 kg).   No results found for: \"PGLU\"    Vital signs reviewed.Appears stated age, well groomed.    Constitutional:  Bp wnl for patient. Pulse Regular and tachy but wnl for patient (per mom). Respirations easy and unlabored. Temperature wnl. Patient is thin. Appearance neat and clean. Appears in no acute distress.    Musculoskeletal:  Patient is dependent for all transfers and mobility, contractures of upper extremities, hips.  Integumentary:  refer to wound characteristics and images   Psychiatric:  Judgment and insight intact of  parent/guardian. Alert and oriented times 3. No evidence of depression, anxiety, or agitation. Calm, cooperative, and communicative.    DIAGNOSTICS:     Lab Results   Component Value Date    BUN 9 06/13/2024    CREATSERUM 0.36 (L) 06/13/2024    ALB 4.1 06/13/2024    TP 8.1 06/13/2024       WOUND ASSESSMENT:     Wound 07/11/24 #1 Ischium Right (Active)   Date First Assessed/Time First Assessed: 07/11/24 0834    Wound Number (Wound Clinic Only): #1  Primary Wound Type: Pressure Injury  Location: Ischium  Wound Location Orientation: Right      Assessments 7/11/2024  8:35 AM 7/25/2024  9:00 AM   Wound Image       Drainage Amount Scant Scant   Drainage Description Serosanguineous Yellow   Wound Length (cm) 2 cm 0.9 cm   Wound Width (cm) 2 cm 0.9 cm   Wound Surface Area (cm^2) 4 cm^2 0.81 cm^2   Wound Depth (cm) 0.1 cm 0.1 cm   Wound Volume (cm^3) 0.4 cm^3 0.081 cm^3   Wound Healing % -- 80   Margins Well-defined edges Well-defined edges;Epibole (Rolled edges)   Non-staged Wound Description Full thickness Full thickness   Rika-wound Assessment Clean;Ecchymosis;Blanchable erythema Clean;Blanchable erythema   Wound Granulation Tissue Firm;Pink Firm;Pink   Wound Bed Granulation (%) 40 % 80 %   Wound Bed Slough (%) 60 % 20 %   Wound Odor None None   Tunneling? No No   Undermining? No No   Sinus Tracts? No No   Pressure Injury Stage Stage 3 Stage 3       No associated orders.          ASSESSMENT AND PLAN:    1. Spastic quadriplegic cerebral palsy (HCC)    2. Pressure injury of right ischium, stage 3 (HCC)        Risks, benefits, and alternatives of current treatment plan discussed in detail.  Questions and concerns addressed. Red flags to RTC or ED reviewed.  Patient (or parent) agrees to plan.      NOTE TO PATIENT: The 21st Century Cures Act makes clinical notes like these available to patients in the interest of transparency. Clinical notes are medical documents used by physicians and care providers to communicate with each  other. These documents include medical language and terminology, abbreviations, and treatment information that may sound technical and at times possibly unfamiliar. In addition, at times, the verbiage may appear blunt or direct. These documents are one tool providers use to communicate relevant information and clinical opinions of the care providers in a way that allows common understanding of the clinical context.   I spent  39 minutes with the patient. This time included:    preparing to see the patient (eg, review notes and recent diagnostics),  seeing the patient, obtaining and/or reviewing separately obtained history, performing a medically appropriate examination and/or evaluation, counseling and educating the patient, documenting in the record   DISCHARGE:      Patient Instructions   Please return:   2 WEEKS    Patient discharge and wound care instructions  Colleen Cunningham  7/25/2024          You may shower and cleanse area with mild soap and water, dab dry with gauze and apply your dressings as directed.     Changing your dressing:              With each incontinence change  Wash your hands with soap and water.  Ensure that the old dressing is removed completely. Place it in a plastic bag and throw it in the trash.  Cleanse the wound with hypochlorous wound cleanser (ie. Anasept, vashe, pure and clean) .   Apply the following dressings: COLOPLAST TRIAD HYDROPHILLIC PASTE    Offloading:  COLLEEN! Try to sit more in your new chair    Off-loading is an important part of your wound treatment program. It is a part that only you can assure is accomplished. If you have any concerns about methods to off-load your wound, or feel that you might have trouble following the off-loading plan prescribed for you, talk to your doctor so that alternatives can be discussed that will work in your situation.    EHOB waffle cushion can be purchased online or through a pharmacy listed in your welcome folder. Please use at all times  when sitting, even when transporting in the car if possible.     Nutrition and blood sugar control:  Focus on the following:  Protein: Meats, beans, eggs, milk and yogurt particularly Greek yogurt), tofu, soy nuts, soy protein products (Follow the protein handout in your welcome folder)  Vitamin C: Citrus fruits and juices, strawberries, tomatoes, tomato juice, peppers, baked potatoes, spinach, broccoli, cauliflower, [place] sprouts, cabbage  Vitamin A: Dark green, leafy vegetables, orange or yellow vegetables, cantaloupe, fortified dairy products, liver, fortified cereals  Zinc: Fortified cereals, red meats, seafood  Consider supplementing with Alber by [employer]. It can be purchased on amazon, [employer] website, or local pharmacy may be able to order it for you.  (These are essential branch chain amino acids that help with tissue building and wound healing).     Concerns:  Signs of infection may include the following:  Increase in redness  Red \"streaks\" from wound  Increase in swelling  Fever  Unusual odor  Change in the amount of wound drainage     Should you experience any significant changes in your wound(s) or have any questions regarding your home care instructions please contact the [facility] @ [phone] If after regular business hours, please call your family doctor or local emergency room. The treatment plan has been discussed at length between you and your provider. Follow all instructions carefully, it is very important. If you do not follow all instructions you are at risk of your wound not healing, infection, possible loss of limb and even loss of life.    Carol Ann Chacko FNP-C, CWCN-AP, CFCN, CSWS, WCC, DWC  7/25/2024

## 2024-07-25 ENCOUNTER — OFFICE VISIT (OUTPATIENT)
Dept: WOUND CARE | Facility: HOSPITAL | Age: 39
End: 2024-07-25
Attending: NURSE PRACTITIONER
Payer: COMMERCIAL

## 2024-07-25 VITALS
HEART RATE: 125 BPM | RESPIRATION RATE: 16 BRPM | SYSTOLIC BLOOD PRESSURE: 111 MMHG | DIASTOLIC BLOOD PRESSURE: 69 MMHG | TEMPERATURE: 98 F

## 2024-07-25 DIAGNOSIS — G80.0 SPASTIC QUADRIPLEGIC CEREBRAL PALSY (HCC): Primary | ICD-10-CM

## 2024-07-25 DIAGNOSIS — L89.313 PRESSURE INJURY OF RIGHT ISCHIUM, STAGE 3 (HCC): ICD-10-CM

## 2024-07-25 PROCEDURE — 99214 OFFICE O/P EST MOD 30 MIN: CPT | Performed by: NURSE PRACTITIONER

## 2024-07-25 NOTE — PATIENT INSTRUCTIONS
Please return:   2 WEEKS    Patient discharge and wound care instructions  Colleen RANGEL Marisa  7/25/2024          You may shower and cleanse area with mild soap and water, dab dry with gauze and apply your dressings as directed.     Changing your dressing:              With each incontinence change  Wash your hands with soap and water.  Ensure that the old dressing is removed completely. Place it in a plastic bag and throw it in the trash.  Cleanse the wound with hypochlorous wound cleanser (ie. Anasept, vashe, pure and clean) .   Apply the following dressings: COLOPLAST TRIAD HYDROPHILLIC PASTE    Offloading:  COLLEEN! Try to sit more in your new chair    Off-loading is an important part of your wound treatment program. It is a part that only you can assure is accomplished. If you have any concerns about methods to off-load your wound, or feel that you might have trouble following the off-loading plan prescribed for you, talk to your doctor so that alternatives can be discussed that will work in your situation.    EHOB waffle cushion can be purchased online or through a pharmacy listed in your welcome folder. Please use at all times when sitting, even when transporting in the car if possible.     Nutrition and blood sugar control:  Focus on the following:  Protein: Meats, beans, eggs, milk and yogurt particularly Greek yogurt), tofu, soy nuts, soy protein products (Follow the protein handout in your welcome folder)  Vitamin C: Citrus fruits and juices, strawberries, tomatoes, tomato juice, peppers, baked potatoes, spinach, broccoli, cauliflower, [place] sprouts, cabbage  Vitamin A: Dark green, leafy vegetables, orange or yellow vegetables, cantaloupe, fortified dairy products, liver, fortified cereals  Zinc: Fortified cereals, red meats, seafood  Consider supplementing with Alber by [employer]. It can be purchased on amazon, [employer], or local pharmacy may be able to order it for you.  (These are essential  branch chain amino acids that help with tissue building and wound healing).     Concerns:  Signs of infection may include the following:  Increase in redness  Red \"streaks\" from wound  Increase in swelling  Fever  Unusual odor  Change in the amount of wound drainage     Should you experience any significant changes in your wound(s) or have any questions regarding your home care instructions please contact the [facility] @ [phone] If after regular business hours, please call your family doctor or local emergency room. The treatment plan has been discussed at length between you and your provider. Follow all instructions carefully, it is very important. If you do not follow all instructions you are at risk of your wound not healing, infection, possible loss of limb and even loss of life.

## 2024-07-25 NOTE — PROGRESS NOTES
.Weekly Wound Education Note    Teaching Provided To: Patient;Family  Training Topics: Dressing;Cleasing and general instructions;Discharge instructions;Off-loading  Training Method: Explain/Verbal;Written  Training Response: Patient responds and understands        Notes: Wounds improving. Dressing changed triad paste. Pt encouraged to use her new wheelchair as much as possible when out of bed.

## 2024-07-31 ENCOUNTER — TELEPHONE (OUTPATIENT)
Dept: INTERNAL MEDICINE CLINIC | Facility: CLINIC | Age: 39
End: 2024-07-31

## 2024-07-31 NOTE — TELEPHONE ENCOUNTER
Called Gabrielle, we would need what company pt will be using in the network for her and what they would need from PCP to fill out for it. Gabrielle will find out and let us know. Our fax number provided.

## 2024-07-31 NOTE — TELEPHONE ENCOUNTER
Patient's sister, Gabrielle, called to request an order for an electronically operated hoya lift with remote for her mother's home.  The current lift has a crank and is harder for her mom to operate with patient in it.  Please contact Gabrielle with any questions.   Daria (mother) can be reached at [phone].  Please advise.  Thank you!

## 2024-08-08 ENCOUNTER — MED REC SCAN ONLY (OUTPATIENT)
Dept: INTERNAL MEDICINE CLINIC | Facility: CLINIC | Age: 39
End: 2024-08-08

## 2024-08-12 NOTE — PROGRESS NOTES
CHIEF COMPLAINT:     No chief complaint on file.    HPI:   Information obtained from Patient/guardian and chart  7-11-24 INITIAL:  patient is a 39 yo female with hx of spastic quadriplegia cerebral palsy who presents with mom for a right buttocks pressure injury that started several months ago and it was not helpful when patient did have bout of n/v/d that was treated zofran and lomotil. Final stool panel was positive for rotavirus and yersinia enterocolitica-supportive care recommended.   The mom has been treating the wound with honey gel and mepilex as recommended by pcp.  Labs from June 2024 are unremarkable with a wnl albumin and total protein.  Patient does have a new wheelchair s/p seating eval with nu motion and the wc has a air/roho cushion for offloading however patient does not like the new chair as it is more difficult for her to use. Currently she is using her 19 yo wc with a gel cushion. Both chairs are a tilt in space. Patient sleeps in a regular bed on her stomach. She is incontinent of urine and stool, but lately has had constipation.  Patient is communicative, but dependent for all movement.  Her left hip is becoming less flexible which puts pressure on the right when she sits. I stressed to the patient several times the importance of offloading and getting used to her new chair.  The wound is very painful to touch. Will culture and start emperic topical. D/w mom that because of the area the culture will grow bacteria and will determine once we get the sensitiivites if patient needs a po abx.      7-25-24 patient returns with mom and sister, culture was + for normal skin issac, they have been dressing with gentamicin topical. Patient's pain is improved. Patient has been using using the new chair in the mornings, and then the old chair after she gets up from her nap.  They have another wheelchair appointment with [employer] next week to learn more about the chair. We discussed again pressure and  pressure relief with tilting the chair.  We also discussed skin care/cleansing. Will transition to triad coloplast paste.    8-13-24 patient returns with ***.  Appointment with nu motion?***time in old chair vs new chair?***. They have been utilizing triad paste. Wound is **** pain is ****     MEDICATIONS:     Current Outpatient Medications:     Wound Dressings ([employer] Card[employer]Y WOUND/BURN DRESSING) External Paste, Apply 1 Application topically daily as needed., Disp: 44 mL, Rfl: 0    Wound Dressings (MEDIHONEY CA ALGINATE 2\"X2\") External Pads, Apply 1 each topically as needed., Disp: 30 each, Rfl: 0    baclofen 10 MG Oral Tab, Take 0.5 tablets (5 mg total) by mouth 2 (two) times daily., Disp: 90 tablet, Rfl: 3  ALLERGIES:   No Known Allergies   REVIEW OF SYSTEMS:   This information was obtained from the patient/family and chart.    See HPI for pertinent positives, otherwise 10 pt ROS negative.  HISTORY:   Past medical, surgical, family and social history updated where appropriate.  PHYSICAL EXAM:     There were no vitals filed for this visit.       Estimated body mass index is 15.45 kg/m² as calculated from the following:    Height as of 6/13/24: 64\".    Weight as of 7/11/24: 90 lb (40.8 kg).   No results found for: \"PGLU\"    Vital signs reviewed.Appears stated age, well groomed.    Constitutional:  Bp ***wnl for patient. Pulse Regular and tachy ***but wnl for patient (per mom). Respirations easy and unlabored. Temperature wnl. Patient is thin. Appearance neat and clean. Appears in no acute distress.    Musculoskeletal:  Patient is dependent for all transfers and mobility, contractures of upper extremities, hips.  Integumentary:  refer to wound characteristics and images   Psychiatric:  Judgment and insight intact of parent/guardian. Alert and oriented times 3. No evidence of depression, anxiety, or agitation. Calm, cooperative, and communicative.    DIAGNOSTICS:     Lab Results   Component Value Date    BUN 9 06/13/2024     CREATSERUM 0.36 (L) 06/13/2024    ALB 4.1 06/13/2024    TP 8.1 06/13/2024       WOUND ASSESSMENT:     Wound 07/11/24 #1 Ischium Right (Active)   Date First Assessed/Time First Assessed: 07/11/24 0834    Wound Number (Wound Clinic Only): #1  Primary Wound Type: Pressure Injury  Location: Ischium  Wound Location Orientation: Right      Assessments 7/11/2024  8:35 AM 7/25/2024  9:00 AM   Wound Image       Drainage Amount Scant Scant   Drainage Description Serosanguineous Yellow   Wound Length (cm) 2 cm 0.9 cm   Wound Width (cm) 2 cm 0.9 cm   Wound Surface Area (cm^2) 4 cm^2 0.81 cm^2   Wound Depth (cm) 0.1 cm 0.1 cm   Wound Volume (cm^3) 0.4 cm^3 0.081 cm^3   Wound Healing % -- 80   Margins Well-defined edges Well-defined edges;Epibole (Rolled edges)   Non-staged Wound Description Full thickness Full thickness   Rika-wound Assessment Clean;Ecchymosis;Blanchable erythema Clean;Blanchable erythema   Wound Granulation Tissue Firm;Pink Firm;Pink   Wound Bed Granulation (%) 40 % 80 %   Wound Bed Slough (%) 60 % 20 %   Wound Odor None None   Tunneling? No No   Undermining? No No   Sinus Tracts? No No   Pressure Injury Stage Stage 3 Stage 3       No associated orders.          ASSESSMENT AND PLAN:    There are no diagnoses linked to this encounter.        Risks, benefits, and alternatives of current treatment plan discussed in detail.  Questions and concerns addressed. Red flags to RTC or ED reviewed.  Patient (or parent) agrees to plan.      NOTE TO PATIENT: The 21st Century Cures Act makes clinical notes like these available to patients in the interest of transparency. Clinical notes are medical documents used by physicians and care providers to communicate with each other. These documents include medical language and terminology, abbreviations, and treatment information that may sound technical and at times possibly unfamiliar. In addition, at times, the verbiage may appear blunt or direct. These documents are one tool  providers use to communicate relevant information and clinical opinions of the care providers in a way that allows common understanding of the clinical context.   I spent  ***minutes with the patient. This time included:    preparing to see the patient (eg, review notes and recent diagnostics),  seeing the patient, obtaining and/or reviewing separately obtained history, performing a medically appropriate examination and/or evaluation, counseling and educating the patient, documenting in the record   DISCHARGE:      There are no Patient Instructions on file for this visit.   Carol Ann Chacko FNP-C, CWCN-AP, CFCN, CSWS, WCC, DWC  8/13/2024

## 2024-08-13 ENCOUNTER — APPOINTMENT (OUTPATIENT)
Dept: WOUND CARE | Facility: HOSPITAL | Age: 39
End: 2024-08-13
Attending: NURSE PRACTITIONER
Payer: COMMERCIAL

## 2024-08-13 ENCOUNTER — TELEPHONE (OUTPATIENT)
Dept: INTERNAL MEDICINE CLINIC | Facility: CLINIC | Age: 39
End: 2024-08-13

## 2024-08-16 ENCOUNTER — TELEPHONE (OUTPATIENT)
Dept: INTERNAL MEDICINE CLINIC | Facility: CLINIC | Age: 39
End: 2024-08-16

## 2024-08-16 NOTE — TELEPHONE ENCOUNTER
Patients sister called to see if we had any recorded diagnoses of patients cerebral palsy from when the patient was 21. If not she is just wanting the earliest diagnoses or record of cerebral palsy that we have on file or a direction as to how she can get those records. Please advise thank you!

## 2024-08-16 NOTE — TELEPHONE ENCOUNTER
Earliest record we have from [facility] is from visit with  on 3/26/2014 so only 10 years back. Are you able to advise on how to obtain records from 2007? Please advise, thanks!!

## 2024-08-19 NOTE — PROGRESS NOTES
CHIEF COMPLAINT:     Chief Complaint   Patient presents with    Wound Care     Pt here for follow up. No new concerns for MD. Mother states she has noticed no drainage. Been putting paste on wound.     HPI:   Information obtained from Patient/guardian and chart  7-11-24 INITIAL:  patient is a 37 yo female with hx of spastic quadriplegia cerebral palsy who presents with mom for a right buttocks pressure injury that started several months ago and it was not helpful when patient did have bout of n/v/d that was treated zofran and lomotil. Final stool panel was positive for rotavirus and yersinia enterocolitica-supportive care recommended.   The mom has been treating the wound with honey gel and mepilex as recommended by pcp.  Labs from June 2024 are unremarkable with a wnl albumin and total protein.  Patient does have a new wheelchair s/p seating eval with nu motion and the wc has a air/roho cushion for offloading however patient does not like the new chair as it is more difficult for her to use. Currently she is using her 19 yo wc with a gel cushion. Both chairs are a tilt in space. Patient sleeps in a regular bed on her stomach. She is incontinent of urine and stool, but lately has had constipation.  Patient is communicative, but dependent for all movement.  Her left hip is becoming less flexible which puts pressure on the right when she sits. I stressed to the patient several times the importance of offloading and getting used to her new chair.  The wound is very painful to touch. Will culture and start emperic topical. D/w mom that because of the area the culture will grow bacteria and will determine once we get the sensitiivites if patient needs a po abx.      7-25-24 patient returns with mom and sister, culture was + for normal skin issac, they have been dressing with gentamicin topical. Patient's pain is improved. Patient has been using using the new chair in the mornings, and then the old chair after she gets up  from her nap.  They have another wheelchair appointment with esteban ward next week to learn more about the chair. We discussed again pressure and pressure relief with tilting the chair.  We also discussed skin care/cleansing. Will transition to triad coloplast paste.    8-20-24 Patient had an appointment last week but missed it.  today patient returns with mom.  The new chair has been in the shop for 2 weeks trying to get the tilt in space to work. They just picked it up yesterday, but that function is still not working consistently. Patient will start going to her new chair again-building up during the day. They have been utilizing triad paste. Wound is smaller, but the triad was very drying to patient's skin which is increasing pain in the area. Will utilize calmoseptine. No s/s of infection.    MEDICATIONS:     Current Outpatient Medications:     Wound Dressings (MEDIHONEY WOUND/BURN DRESSING) External Paste, Apply 1 Application topically daily as needed., Disp: 44 mL, Rfl: 0    Wound Dressings (MEDIHONEY CA ALGINATE 2\"X2\") External Pads, Apply 1 each topically as needed., Disp: 30 each, Rfl: 0    baclofen 10 MG Oral Tab, Take 0.5 tablets (5 mg total) by mouth 2 (two) times daily., Disp: 90 tablet, Rfl: 3  ALLERGIES:   No Known Allergies   REVIEW OF SYSTEMS:   This information was obtained from the patient/family and chart.    See HPI for pertinent positives, otherwise 10 pt ROS negative.  HISTORY:   Past medical, surgical, family and social history updated where appropriate.  PHYSICAL EXAM:     Vitals:    08/20/24 0929   BP: 122/85   Pulse: 115   Resp: 18   Temp: 98.1 °F (36.7 °C)      Estimated body mass index is 15.45 kg/m² as calculated from the following:    Height as of 6/13/24: 64\".    Weight as of 7/11/24: 90 lb (40.8 kg).   No results found for: \"PGLU\"    Vital signs reviewed.Appears stated age, well groomed.    Constitutional:  Bp wnl for patient. Pulse Regular and tachy but wnl for patient (per mom).  Respirations easy and unlabored. Temperature wnl. Patient is thin. Appearance neat and clean. Appears in no acute distress.    Musculoskeletal:  Patient is dependent for all transfers and mobility, contractures of upper extremities, hips.  Integumentary:  refer to wound characteristics and images   Psychiatric:  Judgment and insight intact of parent/guardian. Alert and oriented times 3. No evidence of depression, anxiety, or agitation. Calm, cooperative, and communicative.    DIAGNOSTICS:     Lab Results   Component Value Date    BUN 9 06/13/2024    CREATSERUM 0.36 (L) 06/13/2024    ALB 4.1 06/13/2024    TP 8.1 06/13/2024       WOUND ASSESSMENT:     Wound 07/11/24 #1 Ischium Right (Active)   Date First Assessed/Time First Assessed: 07/11/24 0834    Wound Number (Wound Clinic Only): #1  Primary Wound Type: Pressure Injury  Location: Ischium  Wound Location Orientation: Right      Assessments 7/11/2024  8:35 AM 8/20/2024  9:36 AM   Wound Image        Drainage Amount Scant None   Drainage Description Serosanguineous --   Wound Length (cm) 2 cm 0.2 cm   Wound Width (cm) 2 cm 0.3 cm   Wound Surface Area (cm^2) 4 cm^2 0.06 cm^2   Wound Depth (cm) 0.1 cm 0.1 cm   Wound Volume (cm^3) 0.4 cm^3 0.006 cm^3   Wound Healing % -- 99   Margins Well-defined edges Well-defined edges;Epibole (Rolled edges)   Non-staged Wound Description Full thickness Full thickness   Rika-wound Assessment Clean;Ecchymosis;Blanchable erythema Clean   Wound Granulation Tissue Firm;Pink --   Wound Bed Granulation (%) 40 % --   Wound Bed Slough (%) 60 % --   Wound Odor None None   Shape -- ADALBERTO tissue type   Tunneling? No No   Undermining? No No   Sinus Tracts? No No   Pressure Injury Stage Stage 3 Stage 3       No associated orders.          ASSESSMENT AND PLAN:    1. Spastic quadriplegic cerebral palsy (HCC)    2. Pressure injury of right ischium, stage 3 (HCC)          Risks, benefits, and alternatives of current treatment plan discussed in detail.   Questions and concerns addressed. Red flags to RTC or ED reviewed.  Patient (or parent) agrees to plan.      NOTE TO PATIENT: The 21st Century Cures Act makes clinical notes like these available to patients in the interest of transparency. Clinical notes are medical documents used by physicians and care providers to communicate with each other. These documents include medical language and terminology, abbreviations, and treatment information that may sound technical and at times possibly unfamiliar. In addition, at times, the verbiage may appear blunt or direct. These documents are one tool providers use to communicate relevant information and clinical opinions of the care providers in a way that allows common understanding of the clinical context.   I spent  25 minutes with the patient. This time included:    preparing to see the patient (eg, review notes and recent diagnostics),  seeing the patient, obtaining and/or reviewing separately obtained history, performing a medically appropriate examination and/or evaluation, counseling and educating the patient, documenting in the record   DISCHARGE:      Patient Instructions   Please return:    2-3 WEEKS    Patient discharge and wound care instructions  Colleen Cunningham  8/20/2024       You may shower and cleanse area with mild soap and water, dab dry with gauze and apply your dressings as directed.     Changing your dressing:              With each incontinence change  Wash your hands with soap and water.  Ensure that the old dressing is removed completely. Place it in a plastic bag and throw it in the trash.  Cleanse the wound with hypochlorous wound cleanser (ie. Anasept, vashe, pure and clean) .   Apply the following dressings:  Calmoseptine    Offloading:  NEGRA Try to sit more in your new chair    Off-loading is an important part of your wound treatment program. It is a part that only you can assure is accomplished. If you have any concerns about methods to  off-load your wound, or feel that you might have trouble following the off-loading plan prescribed for you, talk to your doctor so that alternatives can be discussed that will work in your situation.    GARY zhule cushion can be purchased online or through a pharmacy listed in your welcome folder. Please use at all times when sitting, even when transporting in the car if possible.     Nutrition and blood sugar control:  Focus on the following:  Protein: Meats, beans, eggs, milk and yogurt particularly Greek yogurt), tofu, soy nuts, soy protein products (Follow the protein handout in your welcome folder)  Vitamin C: Citrus fruits and juices, strawberries, tomatoes, tomato juice, peppers, baked potatoes, spinach, broccoli, cauliflower, [place] sprouts, cabbage  Vitamin A: Dark green, leafy vegetables, orange or yellow vegetables, cantaloupe, fortified dairy products, liver, fortified cereals  Zinc: Fortified cereals, red meats, seafood  Consider supplementing with Alber by [employer]. It can be purchased on amazon, [employer] website, or local pharmacy may be able to order it for you.  (These are essential branch chain amino acids that help with tissue building and wound healing).     Concerns:  Signs of infection may include the following:  Increase in redness  Red \"streaks\" from wound  Increase in swelling  Fever  Unusual odor  Change in the amount of wound drainage     Should you experience any significant changes in your wound(s) or have any questions regarding your home care instructions please contact the wound center [facility] @ [phone] If after regular business hours, please call your family doctor or local emergency room. The treatment plan has been discussed at length between you and your provider. Follow all instructions carefully, it is very important. If you do not follow all instructions you are at risk of your wound not healing, infection, possible loss of limb and even loss of life.    Carol Ann  E Ginna SCHMITT-C, MARITO-AP, CFCN, CSWS, WCC, DWC  8/20/2024

## 2024-08-20 ENCOUNTER — OFFICE VISIT (OUTPATIENT)
Dept: WOUND CARE | Facility: HOSPITAL | Age: 39
End: 2024-08-20
Attending: NURSE PRACTITIONER
Payer: COMMERCIAL

## 2024-08-20 VITALS
TEMPERATURE: 98 F | DIASTOLIC BLOOD PRESSURE: 85 MMHG | RESPIRATION RATE: 18 BRPM | HEART RATE: 115 BPM | SYSTOLIC BLOOD PRESSURE: 122 MMHG

## 2024-08-20 DIAGNOSIS — L89.313 PRESSURE INJURY OF RIGHT ISCHIUM, STAGE 3 (HCC): ICD-10-CM

## 2024-08-20 DIAGNOSIS — G80.0 SPASTIC QUADRIPLEGIC CEREBRAL PALSY (HCC): Primary | ICD-10-CM

## 2024-08-20 PROCEDURE — 99213 OFFICE O/P EST LOW 20 MIN: CPT | Performed by: NURSE PRACTITIONER

## 2024-08-20 NOTE — PROGRESS NOTES
.Weekly Wound Education Note    Teaching Provided To: Patient;Family  Training Topics: Dressing;Discharge instructions;Cleasing and general instructions  Training Method: Explain/Verbal;Written  Training Response: Patient responds and understands        Notes: Wound improving. Dressing changed to sample calmoseptine cream.

## 2024-08-20 NOTE — PATIENT INSTRUCTIONS
Please return:    2-3 WEEKS    Patient discharge and wound care instructions  Colleen Cunningham  8/20/2024       You may shower and cleanse area with mild soap and water, dab dry with gauze and apply your dressings as directed.     Changing your dressing:              With each incontinence change  Wash your hands with soap and water.  Ensure that the old dressing is removed completely. Place it in a plastic bag and throw it in the trash.  Cleanse the wound with hypochlorous wound cleanser (ie. Anasept, vashe, pure and clean) .   Apply the following dressings:  Calmoseptine    Offloading:  COLLEEN! Try to sit more in your new chair    Off-loading is an important part of your wound treatment program. It is a part that only you can assure is accomplished. If you have any concerns about methods to off-load your wound, or feel that you might have trouble following the off-loading plan prescribed for you, talk to your doctor so that alternatives can be discussed that will work in your situation.    EHOB waffle cushion can be purchased online or through a pharmacy listed in your welcome folder. Please use at all times when sitting, even when transporting in the car if possible.     Nutrition and blood sugar control:  Focus on the following:  Protein: Meats, beans, eggs, milk and yogurt particularly Greek yogurt), tofu, soy nuts, soy protein products (Follow the protein handout in your welcome folder)  Vitamin C: Citrus fruits and juices, strawberries, tomatoes, tomato juice, peppers, baked potatoes, spinach, broccoli, cauliflower, [place] sprouts, cabbage  Vitamin A: Dark green, leafy vegetables, orange or yellow vegetables, cantaloupe, fortified dairy products, liver, fortified cereals  Zinc: Fortified cereals, red meats, seafood  Consider supplementing with Alber by [employer]. It can be purchased on amazon, [employer] website, or local pharmacy may be able to order it for you.  (These are essential branch chain amino acids  that help with tissue building and wound healing).     Concerns:  Signs of infection may include the following:  Increase in redness  Red \"streaks\" from wound  Increase in swelling  Fever  Unusual odor  Change in the amount of wound drainage     Should you experience any significant changes in your wound(s) or have any questions regarding your home care instructions please contact the wound center [facility] @ [phone] If after regular business hours, please call your family doctor or local emergency room. The treatment plan has been discussed at length between you and your provider. Follow all instructions carefully, it is very important. If you do not follow all instructions you are at risk of your wound not healing, infection, possible loss of limb and even loss of life.

## 2024-08-21 ENCOUNTER — TELEPHONE (OUTPATIENT)
Dept: INTERNAL MEDICINE CLINIC | Facility: CLINIC | Age: 39
End: 2024-08-21

## 2024-08-21 NOTE — TELEPHONE ENCOUNTER
Medical record request form completed by patient's sister and faxed to [employer].  Confirmation received.

## 2024-08-21 NOTE — TELEPHONE ENCOUNTER
Patient's sister, Gabrielle,  brought in a \"Medical Report for Conditions that May Impair Driving Safely\" that needs to be completed.  Form placed in triage incoming bin.  Please call Gabrielle once the form is done.  Thank you!

## 2024-08-21 NOTE — TELEPHONE ENCOUNTER
Left message on janae's cell, she has to come in to fill out SUDEEP form and make sure to include dates of records they need. Form should then be faxed to [facility] [phone]

## 2024-08-23 NOTE — TELEPHONE ENCOUNTER
Copy made and sent for scanning. LM for Gabrielle to call back- form in triage complete right now, will confirm that she will  vs mail.

## 2024-09-09 NOTE — PROGRESS NOTES
CHIEF COMPLAINT:     Chief Complaint   Patient presents with    Wound Care     Here for follow-up appointment. Denies new concerns. Patient's mom believes that patient may be healed. Dressing in place.     HPI:   Information obtained from Patient/guardian and chart  7-11-24 INITIAL:  patient is a 39 yo female with hx of spastic quadriplegia cerebral palsy who presents with mom for a right buttocks pressure injury that started several months ago and it was not helpful when patient did have bout of n/v/d that was treated zofran and lomotil. Final stool panel was positive for rotavirus and yersinia enterocolitica-supportive care recommended.   The mom has been treating the wound with honey gel and mepilex as recommended by pcp.  Labs from June 2024 are unremarkable with a wnl albumin and total protein.  Patient does have a new wheelchair s/p seating eval with nu motion and the wc has a air/roho cushion for offloading however patient does not like the new chair as it is more difficult for her to use. Currently she is using her 19 yo wc with a gel cushion. Both chairs are a tilt in space. Patient sleeps in a regular bed on her stomach. She is incontinent of urine and stool, but lately has had constipation.  Patient is communicative, but dependent for all movement.  Her left hip is becoming less flexible which puts pressure on the right when she sits. I stressed to the patient several times the importance of offloading and getting used to her new chair.  The wound is very painful to touch. Will culture and start emperic topical. D/w mom that because of the area the culture will grow bacteria and will determine once we get the sensitiivites if patient needs a po abx.      7-25-24 patient returns with mom and sister, culture was + for normal skin issac, they have been dressing with gentamicin topical. Patient's pain is improved. Patient has been using using the new chair in the mornings, and then the old chair after she  gets up from her nap.  They have another wheelchair appointment with esteban ward next week to learn more about the chair. We discussed again pressure and pressure relief with tilting the chair.  We also discussed skin care/cleansing. Will transition to triad coloplast paste.    8-20-24 Patient had an appointment last week but missed it.  today patient returns with mom.  The new chair has been in the shop for 2 weeks trying to get the tilt in space to work. They just picked it up yesterday, but that function is still not working consistently. Patient will start going to her new chair again-building up during the day. They have been utilizing triad paste. Wound is smaller, but the triad was very drying to patient's skin which is increasing pain in the area. Will utilize calmoseptine. No s/s of infection.    9-10-24 patient returns.  Last visit they had the new chair but were having difficulty with the tilt in space function.  This is now fixed and patient has been spending more time in the new chair. Wound is resolved. We discussed other pressure points and how to assess for continued pressure and \"blancheability\" as well as protecting from moisture with barrier paste.  Patient dc'd to return as needed.    MEDICATIONS:     Current Outpatient Medications:     Wound Dressings (MEDIHONEY WOUND/BURN DRESSING) External Paste, Apply 1 Application topically daily as needed., Disp: 44 mL, Rfl: 0    Wound Dressings (MEDIHONEY CA ALGINATE 2\"X2\") External Pads, Apply 1 each topically as needed., Disp: 30 each, Rfl: 0    baclofen 10 MG Oral Tab, Take 0.5 tablets (5 mg total) by mouth 2 (two) times daily., Disp: 90 tablet, Rfl: 3  ALLERGIES:   No Known Allergies   REVIEW OF SYSTEMS:   This information was obtained from the patient/family and chart.    See HPI for pertinent positives, otherwise 10 pt ROS negative.  HISTORY:   Past medical, surgical, family and social history updated where appropriate.  PHYSICAL EXAM:     Vitals:     09/10/24 1055   BP: 107/73   Pulse: 116   Resp: 14   Temp: 98.2 °F (36.8 °C)        Estimated body mass index is 15.45 kg/m² as calculated from the following:    Height as of 6/13/24: 64\".    Weight as of 7/11/24: 90 lb (40.8 kg).   No results found for: \"PGLU\"    Vital signs reviewed.Appears stated age, well groomed.    Constitutional:  Bp wnl for patient. Pulse Regular and tachy but wnl for patient (per mom). Respirations easy and unlabored. Temperature wnl. Patient is thin. Appearance neat and clean. Appears in no acute distress.    Musculoskeletal:  Patient is dependent for all transfers and mobility, contractures of upper extremities, hips.  Integumentary:  refer to wound characteristics and images   Psychiatric:  Judgment and insight intact of parent/guardian. Alert and oriented times 3. No evidence of depression, anxiety, or agitation. Calm, cooperative, and communicative.    DIAGNOSTICS:     Lab Results   Component Value Date    BUN 9 06/13/2024    CREATSERUM 0.36 (L) 06/13/2024    ALB 4.1 06/13/2024    TP 8.1 06/13/2024       WOUND ASSESSMENT:     Wound 07/11/24 #1 Ischium Right (Active)   Date First Assessed/Time First Assessed: 07/11/24 0834    Wound Number (Wound Clinic Only): #1  Primary Wound Type: Pressure Injury  Location: Ischium  Wound Location Orientation: Right      Assessments 7/11/2024  8:35 AM 9/10/2024 11:00 AM   Wound Image       Drainage Amount Scant None   Drainage Description Serosanguineous --   Wound Length (cm) 2 cm 0.1 cm   Wound Width (cm) 2 cm 0.1 cm   Wound Surface Area (cm^2) 4 cm^2 0.01 cm^2   Wound Depth (cm) 0.1 cm 0.1 cm   Wound Volume (cm^3) 0.4 cm^3 0.001 cm^3   Wound Healing % -- 100   Margins Well-defined edges Well-defined edges   Non-staged Wound Description Full thickness Full thickness   Rika-wound Assessment Clean;Ecchymosis;Blanchable erythema Clean   Wound Granulation Tissue Firm;Pink Firm;Pink   Wound Bed Granulation (%) 40 % 100 %   Wound Bed Slough (%) 60 % --    Wound Odor None None   Tunneling? No No   Undermining? No No   Sinus Tracts? No No   Pressure Injury Stage Stage 3 Stage 3       No associated orders.          ASSESSMENT AND PLAN:    1. Spastic quadriplegic cerebral palsy (HCC)    2. Pressure injury of right ischium, stage 3 (HCC)      Risks, benefits, and alternatives of current treatment plan discussed in detail.  Questions and concerns addressed. Red flags to RTC or ED reviewed.  Patient (or parent) agrees to plan.      NOTE TO PATIENT: The 21st Century Cures Act makes clinical notes like these available to patients in the interest of transparency. Clinical notes are medical documents used by physicians and care providers to communicate with each other. These documents include medical language and terminology, abbreviations, and treatment information that may sound technical and at times possibly unfamiliar. In addition, at times, the verbiage may appear blunt or direct. These documents are one tool providers use to communicate relevant information and clinical opinions of the care providers in a way that allows common understanding of the clinical context.   I spent  29  minutes with the patient. This time included:    preparing to see the patient (eg, review notes and recent diagnostics),  seeing the patient, obtaining and/or reviewing separately obtained history, performing a medically appropriate examination and/or evaluation, counseling and educating the patient, documenting in the record   DISCHARGE:      Patient Instructions     Patient discharge and wound care instructions  Colleen RANGEL Marisa  9/10/2024     Continue to monitor the pressure areas for \"blanching\"-if not blanching then GET THE PRESSURE OFF!    If her skin is going to be moist-utilize barrier cream with zinc or dimethicone    Continue to reposition every 2 hours    Return as needed.   Carol Ann Chacko FNP-C, CWCN-AP, CFCN, CSWS, WCC, DWC  9/10/2024

## 2024-09-10 ENCOUNTER — OFFICE VISIT (OUTPATIENT)
Dept: WOUND CARE | Facility: HOSPITAL | Age: 39
End: 2024-09-10
Attending: NURSE PRACTITIONER
Payer: COMMERCIAL

## 2024-09-10 VITALS
HEART RATE: 116 BPM | DIASTOLIC BLOOD PRESSURE: 73 MMHG | RESPIRATION RATE: 14 BRPM | TEMPERATURE: 98 F | SYSTOLIC BLOOD PRESSURE: 107 MMHG

## 2024-09-10 DIAGNOSIS — G80.0 SPASTIC QUADRIPLEGIC CEREBRAL PALSY (HCC): Primary | ICD-10-CM

## 2024-09-10 DIAGNOSIS — L89.313 PRESSURE INJURY OF RIGHT ISCHIUM, STAGE 3 (HCC): ICD-10-CM

## 2024-09-10 PROCEDURE — 99213 OFFICE O/P EST LOW 20 MIN: CPT | Performed by: NURSE PRACTITIONER

## 2024-09-10 NOTE — PROGRESS NOTES
.Weekly Wound Education Note    Teaching Provided To: Patient  Training Topics: Dressing;Edema control;Discharge instructions;Compression;Test/procedures  Training Method: Explain/Verbal;Written  Training Response: Patient responds and understands            Per provider, wound is healed. Instructed to monitor pressure points for non blanchable redness. Stated understanding. Pt is discharged from clinic at this time.

## 2024-09-23 ENCOUNTER — OFFICE VISIT (OUTPATIENT)
Dept: INTERNAL MEDICINE CLINIC | Facility: CLINIC | Age: 39
End: 2024-09-23
Payer: COMMERCIAL

## 2024-09-23 ENCOUNTER — HOSPITAL ENCOUNTER (OUTPATIENT)
Dept: GENERAL RADIOLOGY | Age: 39
Discharge: HOME | End: 2024-09-23
Attending: NURSE PRACTITIONER
Payer: COMMERCIAL

## 2024-09-23 VITALS
HEART RATE: 101 BPM | RESPIRATION RATE: 16 BRPM | TEMPERATURE: 98 F | OXYGEN SATURATION: 98 % | DIASTOLIC BLOOD PRESSURE: 64 MMHG | SYSTOLIC BLOOD PRESSURE: 118 MMHG

## 2024-09-23 DIAGNOSIS — M79.652 LEFT THIGH PAIN: ICD-10-CM

## 2024-09-23 DIAGNOSIS — M25.552 ACUTE PAIN OF LEFT HIP: Primary | ICD-10-CM

## 2024-09-23 DIAGNOSIS — M62.838 MUSCLE SPASM: ICD-10-CM

## 2024-09-23 DIAGNOSIS — F41.9 ANXIETY: ICD-10-CM

## 2024-09-23 DIAGNOSIS — M25.552 ACUTE PAIN OF LEFT HIP: ICD-10-CM

## 2024-09-23 DIAGNOSIS — G80.0 SPASTIC QUADRIPLEGIC CEREBRAL PALSY (HCC): ICD-10-CM

## 2024-09-23 PROCEDURE — 73503 X-RAY EXAM HIP UNI 4/> VIEWS: CPT | Performed by: NURSE PRACTITIONER

## 2024-09-23 RX ORDER — ALPRAZOLAM 0.25 MG
0.25 TABLET ORAL NIGHTLY PRN
Qty: 10 TABLET | Refills: 0 | Status: SHIPPED | OUTPATIENT
Start: 2024-09-23

## 2024-09-23 NOTE — PROGRESS NOTES
CHIEF COMPLAINT:     Chief Complaint   Patient presents with    Leg Pain     Recent Leg Pain - Stiffness, Left Side Only    Hip Pain     Left Hip Area       HPI:   Colleen Cunningham is a 38 year old female with history of spastic quadriplegic cerebral palsy coming in accompanied by her mother with complaints of pain to the left hip and left thigh from the past 3-4 weeks or so. Patient has expressive aphasia, information obtained mainly from mom.     She was seeing wound care clinic for a pressure ulcer on the buttocks from the past 2 months and was discharged 2 weeks ago as it had healed. During that time, she has been repositioning frequently and not sure if that is contributing to these symptoms. No swelling noted. Denies any fall, trauma, or injury. She does get muscle spasms and takes baclofen 10 mg BID now. Has not taken anything else.     She has increased anxiety since all of this has been going on. She gets really nervous in the mornings when getting up as her symptoms are worse then. Denies any SHIP. CIARA-7: 17, CSSR: No risk. Patient and mom wondering if she can have something to take prn when she gets really anxious.     Past Medical History:    Allergic rhinitis    Allergy history, eggs    Bleeding mole    L face    Foot callus    R foot    Neck pain on left side    Otitis    L    Pharyngitis    Scoliosis    severe    Spastic quadriplegic cerebral palsy (HCC)    wheel chair dependent    URI (upper respiratory infection)    UTI (urinary tract infection)      Past Surgical History:   Procedure Laterality Date    Dental surgery procedure      x2 since birth    Other surgical history  1989    spinal fusion from thoracic spine on down-severe scoliosis    Other surgical history  1988    bilat legs,  tendon lengthening      Social History:  Social History     Socioeconomic History    Marital status: Single   Tobacco Use    Smoking status: Never     Passive exposure: Never    Smokeless tobacco: Never   Vaping Use     Vaping status: Never Used   Substance and Sexual Activity    Alcohol use: No    Drug use: No   Other Topics Concern    Caffeine Concern Yes    Stress Concern No    Weight Concern No    Special Diet No    Exercise No    Seat Belt Yes      Family History:  Family History   Problem Relation Age of Onset    Heart Disease Paternal Grandmother     Other (Other) Paternal Grandmother     Heart Disease Father     Lipids Father         high cholesterol    Hypertension Father     Cancer Paternal Grandfather         gallbladder    Psychiatric Mother         depression/anxiety    Heart Disease Paternal Uncle     Other (mother adopted) Other     Psychiatric Sister         Depression    Other (Other) Sister         IBS      Allergies:  No Known Allergies   Current Meds:  Current Outpatient Medications   Medication Sig Dispense Refill    ALPRAZolam 0.25 MG Oral Tab Take 1 tablet (0.25 mg total) by mouth nightly as needed. 10 tablet 0    baclofen 10 MG Oral Tab Take 0.5 tablets (5 mg total) by mouth 2 (two) times daily. (Patient taking differently: Take 1 tablet (10 mg total) by mouth 2 (two) times daily.) 90 tablet 3       Counseling given: Not Answered       REVIEW OF SYSTEMS:   See HPI.    EXAM:     /64 (BP Location: Right arm, Patient Position: Sitting, Cuff Size: child)   Pulse 101   Temp 97.9 °F (36.6 °C) (Temporal)   Resp 16   LMP 09/17/2024 (Exact Date)   SpO2 98%   There is no height or weight on file to calculate BMI.   Vital signs reviewed. Appears stated age, well groomed, in no acute distress.  Physical Exam:  GENERAL: Patient is alert, awake and oriented, well developed, well nourished.  HEENT: Head: Normocephalic, atraumatic.   HEART: RRR without murmur.  LUNGS: Clear to auscultation bilaterally, no rales/rhonchi/wheezing.  ABDOMEN: good BS's, no masses, HSM or tenderness  MUSCULOSKELETAL: Bilateral wrist flexion contractures noted.   EXTREMITIES: Exam is limited as patient is immobile and wheelchair bound  and unable to get on exam table. No TTP to the left hip or left thigh, no erythema or swelling noted.  NEURO: Oriented time three.     LABS:      Lab Results   Component Value Date    WBC 10.5 06/13/2024    RBC 4.87 06/13/2024    HGB 14.6 06/13/2024    HCT 42.6 06/13/2024    MCV 87.5 06/13/2024    MCH 30.0 06/13/2024    MCHC 34.3 06/13/2024    RDW 12.5 06/13/2024    .0 06/13/2024      Lab Results   Component Value Date    GLU 93 06/13/2024    BUN 9 06/13/2024    BUNCREA 15.9 09/21/2019    CREATSERUM 0.36 (L) 06/13/2024    ANIONGAP 8 06/13/2024    GFRNAA 132 09/21/2019    GFRAA 152 09/21/2019    CA 9.1 06/13/2024    OSMOCALC 282 06/13/2024    ALKPHO 66 06/13/2024    AST 16 06/13/2024    ALT 16 06/13/2024    BILT 0.9 06/13/2024    TP 8.1 06/13/2024    ALB 4.1 06/13/2024    GLOBULIN 4.0 06/13/2024     06/13/2024    K 3.4 (L) 06/13/2024     06/13/2024    CO2 25.0 06/13/2024      Lab Results   Component Value Date    CHOLEST 165 02/27/2024    TRIG 54 02/27/2024    HDL 64 (H) 02/27/2024    LDL 90 02/27/2024    VLDL 9 02/27/2024    NONHDLC 101 02/27/2024      Lab Results   Component Value Date    TSH 2.400 02/27/2024      No results found for: \"EAG\", \"A1C\"     IMAGING:     No results found.     ASSESSMENT AND PLAN:   1. Acute pain of left hip  2. Left thigh pain  3. Muscle spasm  4. Spastic quadriplegic cerebral palsy (HCC)  -Do x-ray of left hip  -Conservative management discussed rest ice/heat  -Can take tylenol or ibuprofen prn  -Continue baclofen 10 mg BID   -Start HH PT  - XR HIP + PELVIS MIN 4 VIEWS LEFT (CPT=73503); Future  - [facility] HOME [facility] REFERRAL    5. Anxiety  -CIARA-7: 17; CSSR: No risk  -Can take xanax 0.25 mg sparingly as needed. Aware not to take this daily. SE discussed  -Therapy is hard for the patient as she is mainly nonverbal   - ALPRAZolam 0.25 MG Oral Tab; Take 1 tablet (0.25 mg total) by mouth nightly as needed.  Dispense: 10 tablet; Refill: 0     The patient indicates  understanding of these issues and agrees to the plan.  Return if symptoms worsen or fail to improve.    Maribell Butterfield, APRN  9/23/2024

## 2024-09-26 ENCOUNTER — TELEPHONE (OUTPATIENT)
Dept: WOUND CARE | Facility: HOSPITAL | Age: 39
End: 2024-09-26

## 2024-09-26 NOTE — TELEPHONE ENCOUNTER
Patient's mother LVM stating she had questions. Returned called - went over provider's recommendations for barrier cream to protect area and to minimize moisture to area. Also reminded to monitor pressure areas and to reposition every 2 hours. Desirae verbalized understanding and will call if any issues arise.

## 2024-09-30 ENCOUNTER — TELEPHONE (OUTPATIENT)
Dept: INTERNAL MEDICINE CLINIC | Facility: CLINIC | Age: 39
End: 2024-09-30

## 2024-09-30 NOTE — TELEPHONE ENCOUNTER
Just ERMA Sanderson from [facility] called to inform us that patient will be starting Physical Therapy tomorrow. Thanks!

## 2024-10-01 ENCOUNTER — TELEPHONE (OUTPATIENT)
Dept: INTERNAL MEDICINE CLINIC | Facility: CLINIC | Age: 39
End: 2024-10-01

## 2024-10-01 NOTE — TELEPHONE ENCOUNTER
Is that what wound care wanted her to do for an open wound? They should be doing whatever wound care advised and if it is worse they should revisit them. Thanks.

## 2024-10-01 NOTE — TELEPHONE ENCOUNTER
Spoke to gian guzman. Patient not seeing wound care since last sore cleared up. Doing barrier cream and open to air. Sleeps on stomach. uses a wheelchair/tilt equipment during day and only transfers twice for changes. Please advise-thanks!

## 2024-10-01 NOTE — TELEPHONE ENCOUNTER
Solange called stating physical therapy started today, she wants to see the patient once a wk for the next 6 wks, she currently has a .4cm pressure ulcer on her right buttock, they're adding cream and leaving the wound exposed. The patient sits in the chair most of the day.. Solange wants to know would the patient need more care or intervention? Please advise. She can reached at [phone].

## 2024-10-02 NOTE — TELEPHONE ENCOUNTER
Communication thus far has been with [facility] as documented below.  Called patient directly to discuss below and will see if they can update [facility] of our communication. Lmtcb w/patient.

## 2024-10-10 NOTE — TELEPHONE ENCOUNTER
Talked to pt's mom and she said that pt is doing well and skin care is good and they do not need wound clinic now. Pt is little pressure ulcer but they are watching it and doing everything as were advised by wound clinic .

## 2024-10-11 NOTE — TELEPHONE ENCOUNTER
Incoming (mail or fax):  Fax  Received from:  [facility]  Documentation given to:  Triage in     Signature Req'd - Order(s)

## 2024-10-17 ENCOUNTER — TELEPHONE (OUTPATIENT)
Dept: INTERNAL MEDICINE CLINIC | Facility: CLINIC | Age: 39
End: 2024-10-17

## 2024-10-17 DIAGNOSIS — M62.838 MUSCLE SPASM: ICD-10-CM

## 2024-10-17 NOTE — TELEPHONE ENCOUNTER
See note below from . Looks like 90/3 sent on 12/11/23 for 0.5 tablets (5 mg total) PO BID. Unsure when patient started taking 10 mg BID. Please advise-thanks!

## 2024-10-17 NOTE — TELEPHONE ENCOUNTER
Patients mother called in to inform us that her daughter is almost out of the baclofen from her most recent refill. Says they discussed increasing the dosage with Ira since the original dosage was half a tablet twice a day and she was still experiencing stiffness. She increased the dosage to 1 tablet twice a day and is seeing her symptoms improve. The issue is that the current bottle is almost out because it was refilled for the old dosage not the new one. She's wondering if there was any way she could get a refill even though it might be too soon after the previous one. Please advise thank you!

## 2024-10-18 RX ORDER — BACLOFEN 10 MG/1
10 TABLET ORAL 2 TIMES DAILY
Qty: 180 TABLET | Refills: 0 | Status: SHIPPED | OUTPATIENT
Start: 2024-10-18

## 2024-10-24 ENCOUNTER — HOME HEALTH CHARGES (OUTPATIENT)
Dept: INTERNAL MEDICINE CLINIC | Facility: CLINIC | Age: 39
End: 2024-10-24

## 2024-10-24 DIAGNOSIS — G80.0 CP (CEREBRAL PALSY), SPASTIC, QUADRIPLEGIC (HCC): Primary | ICD-10-CM

## 2024-10-29 ENCOUNTER — TELEPHONE (OUTPATIENT)
Dept: INTERNAL MEDICINE CLINIC | Facility: CLINIC | Age: 39
End: 2024-10-29

## 2024-10-29 NOTE — TELEPHONE ENCOUNTER
[facility] called to say that they are discharging the patient next Tuesday 11/5. She has met all of her PT goals and the wound is almost healed. Solange will need a call back to confirm received and we can leave a voicemail.

## 2024-10-31 NOTE — TELEPHONE ENCOUNTER
Incoming (mail or fax):  fax  Received from:  [facility]  Documentation given to:  Maribell

## 2025-01-07 ENCOUNTER — OFFICE VISIT (OUTPATIENT)
Dept: INTERNAL MEDICINE CLINIC | Facility: CLINIC | Age: 40
End: 2025-01-07
Payer: COMMERCIAL

## 2025-01-07 VITALS
SYSTOLIC BLOOD PRESSURE: 112 MMHG | RESPIRATION RATE: 16 BRPM | OXYGEN SATURATION: 99 % | HEART RATE: 115 BPM | HEIGHT: 62 IN | DIASTOLIC BLOOD PRESSURE: 60 MMHG | BODY MASS INDEX: 16.56 KG/M2 | WEIGHT: 90 LBS | TEMPERATURE: 100 F

## 2025-01-07 DIAGNOSIS — Z00.00 ENCOUNTER FOR ANNUAL PHYSICAL EXAM: Primary | ICD-10-CM

## 2025-01-07 DIAGNOSIS — H61.23 BILATERAL IMPACTED CERUMEN: ICD-10-CM

## 2025-01-07 DIAGNOSIS — M62.838 MUSCLE SPASM: ICD-10-CM

## 2025-01-07 DIAGNOSIS — Z13.220 SCREENING FOR CHOLESTEROL LEVEL: ICD-10-CM

## 2025-01-07 DIAGNOSIS — Z23 NEED FOR VACCINATION: ICD-10-CM

## 2025-01-07 DIAGNOSIS — R01.1 HEART MURMUR: ICD-10-CM

## 2025-01-07 DIAGNOSIS — L89.312 PRESSURE INJURY OF RIGHT BUTTOCK, STAGE 2 (HCC): ICD-10-CM

## 2025-01-07 DIAGNOSIS — E55.9 VITAMIN D DEFICIENCY: ICD-10-CM

## 2025-01-07 DIAGNOSIS — Z13.29 SCREENING FOR THYROID DISORDER: ICD-10-CM

## 2025-01-07 DIAGNOSIS — R00.0 TACHYCARDIA: ICD-10-CM

## 2025-01-07 DIAGNOSIS — G80.0 CP (CEREBRAL PALSY), SPASTIC, QUADRIPLEGIC (HCC): ICD-10-CM

## 2025-01-07 PROCEDURE — 99396 PREV VISIT EST AGE 40-64: CPT | Performed by: NURSE PRACTITIONER

## 2025-01-07 PROCEDURE — 90656 IIV3 VACC NO PRSV 0.5 ML IM: CPT | Performed by: NURSE PRACTITIONER

## 2025-01-07 PROCEDURE — 90471 IMMUNIZATION ADMIN: CPT | Performed by: NURSE PRACTITIONER

## 2025-01-07 PROCEDURE — 99214 OFFICE O/P EST MOD 30 MIN: CPT | Performed by: NURSE PRACTITIONER

## 2025-01-07 NOTE — PATIENT INSTRUCTIONS
Continue wound care on the pressure ulcer. See wound care clinic as needed     TDAP vaccine recommended    COVID vaccine recommended     Get your labs done after feb 27th. You should be fasting for at least 10 hours. If you take a multivitamin with Biotin or any biotin product it should be held for 3 days prior to getting your labs done.     Continue your current medications    Check on coverage for the breast ultrasound.     Get the echo done     Debrox drops to the ears for the ear wax.     Follow up in 1 year or sooner as needed

## 2025-01-07 NOTE — PROGRESS NOTES
CHIEF COMPLAINT   Complete physical, med check    HPI:   Colleen Cunningham is a 39 year old female who presents for a complete physical exam, med check    Wt Readings from Last 6 Encounters:   01/07/25 90 lb (40.8 kg)   07/11/24 90 lb (40.8 kg)   06/13/24 80 lb (36.3 kg)   12/11/23 75 lb (34 kg)   12/12/22 100 lb (45.4 kg)   12/07/22 95 lb (43.1 kg)     Body mass index is 16.46 kg/m².     Diet is good- eating okay. Has not lost weight. Exercise is limited due to cerebral palsy. Vaccines reviewed. Wearing seat belt. Feels safe at home. Pap not able to be done due to physical limitation. Not sexually active. No pelvic concerns. Cycles are regular. No smoking. No alcohol. No skin cancer concerns. Labs to be ordered.     Has cerebral palsy. Muscle spasms- on baclofen. Works well. No SE.      Has tachycardia and a heart murmur. No swelling or sob. Has not gotten echo yet.    Has a pressure ulcer. Is small < pea size currently. Using barrier cream.     Cholesterol, Total (mg/dL)   Date Value   02/27/2024 165   11/29/2022 155   09/21/2019 144     HDL Cholesterol (mg/dL)   Date Value   02/27/2024 64 (H)   11/29/2022 55   09/21/2019 57     LDL Cholesterol (mg/dL)   Date Value   02/27/2024 90   11/29/2022 88   09/21/2019 76     AST (U/L)   Date Value   06/13/2024 16   02/27/2024 17   11/29/2022 16     ALT (U/L)   Date Value   06/13/2024 16   02/27/2024 17   11/29/2022 28        Current Outpatient Medications   Medication Sig Dispense Refill    Acetaminophen  MG Oral Tab CR Take 1 tablet (650 mg total) by mouth every 6 (six) hours as needed for Pain. Chewable Tablet Preferred      baclofen 10 MG Oral Tab Take 1 tablet (10 mg total) by mouth 2 (two) times daily. 180 tablet 3    ALPRAZolam 0.25 MG Oral Tab Take 1 tablet (0.25 mg total) by mouth nightly as needed. 10 tablet 0      No Known Allergies   Past Medical History:    Allergic rhinitis    Allergy history, eggs    Bleeding mole    L face    Foot callus    R foot    Neck  pain on left side    Otitis    L    Pharyngitis    Scoliosis    severe    Spastic quadriplegic cerebral palsy (HCC)    wheel chair dependent    URI (upper respiratory infection)    UTI (urinary tract infection)      Past Surgical History:   Procedure Laterality Date    Dental surgery procedure      x2 since birth    Other surgical history  1989    spinal fusion from thoracic spine on down-severe scoliosis    Other surgical history  1988    bilat legs,  tendon lengthening      Family History   Problem Relation Age of Onset    Heart Disease Paternal Grandmother     Other (Other) Paternal Grandmother     Heart Disease Father     Lipids Father         high cholesterol    Hypertension Father     Cancer Paternal Grandfather         gallbladder    Psychiatric Mother         depression/anxiety    Heart Disease Paternal Uncle     Other (mother adopted) Other     Psychiatric Sister         Depression    Other (Other) Sister         IBS      Social History:   Social History     Socioeconomic History    Marital status: Single   Tobacco Use    Smoking status: Never     Passive exposure: Never    Smokeless tobacco: Never   Vaping Use    Vaping status: Never Used   Substance and Sexual Activity    Alcohol use: No    Drug use: No   Other Topics Concern    Caffeine Concern Yes    Stress Concern No    Weight Concern No    Special Diet No    Exercise No    Seat Belt Yes        REVIEW OF SYSTEMS:   GENERAL: feels well otherwise  SKIN: no complaint of any unusual skin lesions  EYES: no complaint of blurred vision or double vision  HEENT: no complaint of nasal congestion, sinus pain or ST  LUNGS: no complaint of shortness of breath with exertion  CARDIOVASCULAR: no complaint of chest pain on exertion  GI: no complaint of pain,denies heartburn  : no complaints of vaginal discharge or urinary complaints  MUSCULOSKELETAL: no complaint of back pain  NEURO: no complaint of headaches  PSYCHE: no complaint of depression    HEMATOLOGIC: denies  hx of anemia    EXAM:   /60 (BP Location: Right arm, Patient Position: Sitting, Cuff Size: adult)   Pulse 115   Temp 99.5 °F (37.5 °C) (Temporal)   Resp 16   Ht 5' 2\" (1.575 m)   Wt 90 lb (40.8 kg)   LMP 09/17/2024 (Exact Date)   SpO2 99%   BMI 16.46 kg/m²   Body mass index is 16.46 kg/m².   GENERAL: well developed, well nourished,in no apparent distress  SKIN: no rashes,no suspicious lesions  HEENT: atraumatic, normocephalic,ears are with cerumen  EYES: PERRLA, EOM without good control, conjunctiva are clear, has a erythemic raised bump to left upper lid.  NECK: supple, no adenopathy   BREAST: no breast masses, adenopathy or nipple changes. Has nipple inversion which is pts baseline  LUNGS: clear to auscultation; no rhonchi, rales, or wheezing  CARDIO: RRR with murmur to left upper and lower sternal border  GI: no tenderness or masses   :DEFERRED  - denies issues   MUSCULOSKELETAL:  Immobile. Muscle contractions to joints, in wheelchair  EXTREMITIES: no edema or calve tenderness   NEURO: Oriented times three, expressive aphasia. Is aware of situations and answers questions appropriately.     LABS:     Lab Results   Component Value Date    WBC 10.5 06/13/2024    RBC 4.87 06/13/2024    HGB 14.6 06/13/2024    HCT 42.6 06/13/2024    MCV 87.5 06/13/2024    MCH 30.0 06/13/2024    MCHC 34.3 06/13/2024    RDW 12.5 06/13/2024    .0 06/13/2024      Lab Results   Component Value Date    GLU 93 06/13/2024    BUN 9 06/13/2024    BUNCREA 15.9 09/21/2019    CREATSERUM 0.36 (L) 06/13/2024    ANIONGAP 8 06/13/2024    GFRNAA 132 09/21/2019    GFRAA 152 09/21/2019    CA 9.1 06/13/2024    OSMOCALC 282 06/13/2024    ALKPHO 66 06/13/2024    AST 16 06/13/2024    ALT 16 06/13/2024    BILT 0.9 06/13/2024    TP 8.1 06/13/2024    ALB 4.1 06/13/2024    GLOBULIN 4.0 06/13/2024     06/13/2024    K 3.4 (L) 06/13/2024     06/13/2024    CO2 25.0 06/13/2024      Lab Results   Component Value Date    CHOLEST 165  02/27/2024    TRIG 54 02/27/2024    HDL 64 (H) 02/27/2024    LDL 90 02/27/2024    VLDL 9 02/27/2024    NONHDLC 101 02/27/2024      Lab Results   Component Value Date    TSH 2.400 02/27/2024        ASSESSMENT AND PLAN:   1. Encounter for annual physical exam  - Colleen Cunningham is a 36 year old female who presents for a complete physical exam.  Pap and pelvic deferred per pt request. Reviewed diet and exercise.  Pt' s weight is Body mass index is 16.31 kg/m².. Recommended regular exercise as able.  Labs reviewed- ordered. Vaccines reviewed. Complete breast US ordered instead of mammo for breast screening.  - CBC With Differential With Platelet; Future  - Comp Metabolic Panel (14); Future    2. CP (cerebral palsy), spastic, quadriplegic (HCC)  3. Muscle spasm  - has a hx of CP. Is very aware and has good cognition but has expressive aphasia. Is still able to communicate. Has muscle stiffness and spasms. Baclofen is helpful and no SE.   - continue 24 hour care  - continue current medication  - baclofen 10 MG Oral Tab; Take 1 tablet (10 mg total) by mouth 2 (two) times daily.  Dispense: 180 tablet; Refill: 3    4. Heart murmur  - no sob or swelling  - CARD ECHO 2D DOPPLER (CPT=93306); Future    5. Tachycardia  - get echo  - CARD ECHO 2D DOPPLER (CPT=93306); Future    6. Pressure injury of right buttock, stage 2 (HCC)  - continue wound care     7. Vitamin D deficiency  - Vitamin D; Future    8. Bilateral impacted cerumen  - debrox drops    9. Screening for cholesterol level  - Lipid Panel; Future    10. Screening for thyroid disorder  - TSH W Reflex To Free T4; Future    11. Need for vaccination  - INFLUENZA VACCINE, TRI, PRESERV FREE, 0.5 ML       Follow up in one year or sooner as needed  The patient indicates understanding of these issues and agrees to the plan.

## 2025-01-08 RX ORDER — BACLOFEN 10 MG/1
10 TABLET ORAL 2 TIMES DAILY
Qty: 180 TABLET | Refills: 3 | Status: SHIPPED | OUTPATIENT
Start: 2025-01-08

## 2025-01-09 RX ORDER — SENNOSIDES 8.6 MG
650 CAPSULE ORAL EVERY 6 HOURS PRN
COMMUNITY

## 2025-01-10 ENCOUNTER — TELEPHONE (OUTPATIENT)
Dept: INTERNAL MEDICINE CLINIC | Facility: CLINIC | Age: 40
End: 2025-01-10

## 2025-01-10 NOTE — TELEPHONE ENCOUNTER
Can we do letter for pt? If you can advise on the brief history portion I can draft the rest of the letter and attach notes. Please advise, thanks!

## 2025-01-10 NOTE — TELEPHONE ENCOUNTER
Daria (mom) called and asked about a letter from Ira for Pt to be admitted to assisted living home . Mom says that she need note/letter to have med list, brief medical history, and Physical exam office notes. Facility is [facility]. Pt moving in there on Thurs.   Please contact Daria when completed

## 2025-01-10 NOTE — TELEPHONE ENCOUNTER
Me note is ready. The medlist was updated with the tylenol prescription. Okay to provide this - she can pick it up today if possible or she should be able to print my note with the med list from [facility]. Thanks.

## 2025-01-13 NOTE — TELEPHONE ENCOUNTER
Pt's mom calling, advised all will be ready for pt's mom to  today. Letter written. OV note and medication list printed.  All at the  for pt .

## 2025-01-24 ENCOUNTER — MED REC SCAN ONLY (OUTPATIENT)
Dept: INTERNAL MEDICINE CLINIC | Facility: CLINIC | Age: 40
End: 2025-01-24

## 2025-01-27 ENCOUNTER — TELEPHONE (OUTPATIENT)
Dept: INTERNAL MEDICINE CLINIC | Facility: CLINIC | Age: 40
End: 2025-01-27

## 2025-01-27 DIAGNOSIS — L89.312 PRESSURE INJURY OF RIGHT BUTTOCK, STAGE 2 (HCC): Primary | ICD-10-CM

## 2025-01-27 NOTE — TELEPHONE ENCOUNTER
Patient's mother is requesting a referral to the wound clinic for patient.  She has a wound in the fold between her right buttock and her leg that has re-opened.  Patient's mother did not want to schedule an appointment because she said she talked to Ira about this wound at the physical on 1/7/25.  Please advise.  Thank you!

## 2025-01-29 NOTE — PROGRESS NOTES
CHIEF COMPLAINT:     Chief Complaint   Patient presents with    Wound Care     Pt here for initial visit. Approx on month ago she noted black scab to previous wound area. She has been applying honey and gentamicin dry dressing daily     HPI:   Information obtained from Patient/guardian and chart  1-30-25 re-evaluation:  patient is a 38 yo female with hx of spastic quadriplegia cerebral palsy who presents with pressure injury to the right ischium.  I saw the patient in September of last year and she was discharged after healing a right buttocks wound. In summer/fall patient had received a new chair s/p seating eval however at the time of discharge she was having difficulty and was resistant to the change from her old chair. She presents today in the new chair. It has a roho cushion in it, it is tilt in space.  Nu-motion is actually coming today to make some adjustments to the head piece.  She is incontinent bowel and bladder. She sleeps in a regular bed on her stomach. she did spend 4 days in [place] about 2 weeks ago while mom had cataract surgery.  She had a physical with her primary in January and it was noted that she had a small \"pea size\" pressure injury. The wound has been present for about 1 month. They have been treating with honey and gentamicin and leaving NIGEL at [facility].  We discussed that we will continue to utilize the honey, stop the gent, leave it covered at [facility]. Cleanse with anasept and retry the tamir. No s/s of infection. The area is painful to touch.    MEDICATIONS:     Current Outpatient Medications:     Acetaminophen  MG Oral Tab CR, Take 1 tablet (650 mg total) by mouth every 6 (six) hours as needed for Pain. Chewable Tablet Preferred, Disp: , Rfl:     baclofen 10 MG Oral Tab, Take 1 tablet (10 mg total) by mouth 2 (two) times daily., Disp: 180 tablet, Rfl: 3    ALPRAZolam 0.25 MG Oral Tab, Take 1 tablet (0.25 mg total) by mouth nightly as needed., Disp: 10 tablet, Rfl: 0  ALLERGIES:   No Known  Allergies   REVIEW OF SYSTEMS:   This information was obtained from the patient/family and chart.    See HPI for pertinent positives, otherwise 10 pt ROS negative.  HISTORY:   Past medical, surgical, family and social history updated where appropriate.  PHYSICAL EXAM:     Vitals:    01/30/25 1100   BP: 98/65   Pulse: 109   Resp: 14   Temp: 98.1 °F (36.7 °C)   Weight: 90 lb (40.8 kg)   Height: 63\"          Estimated body mass index is 15.94 kg/m² as calculated from the following:    Height as of this encounter: 63\".    Weight as of this encounter: 90 lb (40.8 kg).   No results found for: \"PGLU\"    Vital signs reviewed.Appears stated age, well groomed.    Constitutional:  Bp wnl for patient. Pulse Regular and tachy but wnl for patient (per mom). Respirations easy and unlabored. Temperature wnl. Patient is thin. Appearance neat and clean. Appears in no acute distress.    Musculoskeletal:  Patient is dependent for all transfers and mobility, contractures of upper extremities, hips.  Integumentary:  refer to wound characteristics and images   Psychiatric:  Judgment and insight intact of parent/guardian. Alert and oriented times 3. No evidence of depression, anxiety, or agitation. Calm, cooperative, and communicative.    DIAGNOSTICS:     Lab Results   Component Value Date    BUN 9 06/13/2024    CREATSERUM 0.36 (L) 06/13/2024    ALB 4.1 06/13/2024    TP 8.1 06/13/2024       WOUND ASSESSMENT:     Wound 01/30/25 #1 Ischium Right (Active)   Date First Assessed/Time First Assessed: 01/30/25 1100    Wound Number (Wound Clinic Only): #1  Primary Wound Type: Pressure Injury  Location: Ischium  Wound Location Orientation: Right      Assessments 1/30/2025 11:01 AM   Wound Image     Drainage Amount Unable to assess   Wound Length (cm) 1.5 cm   Wound Width (cm) 0.4 cm   Wound Surface Area (cm^2) 0.6 cm^2   Wound Depth (cm) 0.3 cm   Wound Volume (cm^3) 0.18 cm^3   Margins Well-defined edges   Non-staged Wound Description Full  thickness   Rika-wound Assessment Dry;Blanchable erythema   Wound Granulation Tissue Red;Firm   Wound Bed Granulation (%) 70 %   Wound Bed Slough (%) 30 %   Wound Odor None   Tunneling? No   Undermining? No   Sinus Tracts? No   Pressure Injury Stage Stage 3       No associated orders.        ASSESSMENT AND PLAN:    1. Spastic quadriplegic cerebral palsy (HCC)    2. Pressure injury of right ischium, stage 3 (HCC)        Risks, benefits, and alternatives of current treatment plan discussed in detail.  Questions and concerns addressed. Red flags to RTC or ED reviewed.  Patient (or parent) agrees to plan.      NOTE TO PATIENT: The 21st Century Cures Act makes clinical notes like these available to patients in the interest of transparency. Clinical notes are medical documents used by physicians and care providers to communicate with each other. These documents include medical language and terminology, abbreviations, and treatment information that may sound technical and at times possibly unfamiliar. In addition, at times, the verbiage may appear blunt or direct. These documents are one tool providers use to communicate relevant information and clinical opinions of the care providers in a way that allows common understanding of the clinical context.   I spent  40 minutes with the patient. This time included:    preparing to see the patient (eg, review notes and recent diagnostics),  seeing the patient, obtaining and/or reviewing separately obtained history, performing a medically appropriate examination and/or evaluation, counseling and educating the patient, documenting in the record   DISCHARGE:      Patient Instructions   Please return:    2 WEEKS    Patient discharge and wound care instructions  Colleen Cunningham  1/30/2025         You may shower and cleanse area with mild soap and water, dab dry with gauze and apply your dressings as directed.     Changing your dressing:              With each incontinence change  Wash  your hands with soap and water.  Ensure that the old dressing is removed completely. Place it in a plastic bag and throw it in the trash.  Cleanse the wound with hypochlorous wound cleanser (ie. Anasept, vashe, pure and clean) .         Apply the following dressings: honey gel>bandaid    Offloading:  Off-loading is an important part of your wound treatment program. It is a part that only you can assure is accomplished. If you have any concerns about methods to off-load your wound, or feel that you might have trouble following the off-loading plan prescribed for you, talk to your doctor so that alternatives can be discussed that will work in your situation.    EHOB waffle cushion can be purchased online or through a pharmacy listed in your welcome folder. Please use at all times when sitting, even when transporting in the car if possible.     Nutrition and blood sugar control:  Focus on the following:  Protein: Meats, beans, eggs, milk and yogurt particularly Greek yogurt), tofu, soy nuts, soy protein products (Follow the protein handout in your welcome folder)  Vitamin C: Citrus fruits and juices, strawberries, tomatoes, tomato juice, peppers, baked potatoes, spinach, broccoli, cauliflower, [place] sprouts, cabbage  Vitamin A: Dark green, leafy vegetables, orange or yellow vegetables, cantaloupe, fortified dairy products, liver, fortified cereals  Zinc: Fortified cereals, red meats, seafood  Consider supplementing with Alber by [employer]. It can be purchased on amazon, [employer] website, or local pharmacy may be able to order it for you.  (These are essential branch chain amino acids that help with tissue building and wound healing).     Concerns:  Signs of infection may include the following:  Increase in redness  Red \"streaks\" from wound  Increase in swelling  Fever  Unusual odor  Change in the amount of wound drainage     Should you experience any significant changes in your wound(s) or have any questions  regarding your home care instructions please contact the wound center [facility] @ [phone] If after regular business hours, please call your family doctor or local emergency room. The treatment plan has been discussed at length between you and your provider. Follow all instructions carefully, it is very important. If you do not follow all instructions you are at risk of your wound not healing, infection, possible loss of limb and even loss of life.    Carol Ann Chacko FNP-C, CWCN-AP, CFCN, CSWS, WCC, DWC  1/30/2025

## 2025-01-30 ENCOUNTER — OFFICE VISIT (OUTPATIENT)
Dept: WOUND CARE | Facility: HOSPITAL | Age: 40
End: 2025-01-30
Attending: NURSE PRACTITIONER
Payer: COMMERCIAL

## 2025-01-30 VITALS
HEIGHT: 63 IN | DIASTOLIC BLOOD PRESSURE: 65 MMHG | SYSTOLIC BLOOD PRESSURE: 98 MMHG | TEMPERATURE: 98 F | HEART RATE: 109 BPM | BODY MASS INDEX: 15.95 KG/M2 | WEIGHT: 90 LBS | RESPIRATION RATE: 14 BRPM

## 2025-01-30 DIAGNOSIS — G80.0 SPASTIC QUADRIPLEGIC CEREBRAL PALSY (HCC): Primary | ICD-10-CM

## 2025-01-30 DIAGNOSIS — L89.313 PRESSURE INJURY OF RIGHT ISCHIUM, STAGE 3 (HCC): ICD-10-CM

## 2025-01-30 PROCEDURE — 99215 OFFICE O/P EST HI 40 MIN: CPT | Performed by: NURSE PRACTITIONER

## 2025-01-30 NOTE — PATIENT INSTRUCTIONS
Please return:    2 WEEKS    Patient discharge and wound care instructions  Colleen Cunningham  1/30/2025         You may shower and cleanse area with mild soap and water, dab dry with gauze and apply your dressings as directed.     Changing your dressing:              With each incontinence change  Wash your hands with soap and water.  Ensure that the old dressing is removed completely. Place it in a plastic bag and throw it in the trash.  Cleanse the wound with hypochlorous wound cleanser (ie. Anasept, vashe, pure and clean) .         Apply the following dressings: honey gel>bandaid    Offloading:  Off-loading is an important part of your wound treatment program. It is a part that only you can assure is accomplished. If you have any concerns about methods to off-load your wound, or feel that you might have trouble following the off-loading plan prescribed for you, talk to your doctor so that alternatives can be discussed that will work in your situation.    EHOB waffle cushion can be purchased online or through a pharmacy listed in your welcome folder. Please use at all times when sitting, even when transporting in the car if possible.     Nutrition and blood sugar control:  Focus on the following:  Protein: Meats, beans, eggs, milk and yogurt particularly Greek yogurt), tofu, soy nuts, soy protein products (Follow the protein handout in your welcome folder)  Vitamin C: Citrus fruits and juices, strawberries, tomatoes, tomato juice, peppers, baked potatoes, spinach, broccoli, cauliflower, [place] sprouts, cabbage  Vitamin A: Dark green, leafy vegetables, orange or yellow vegetables, cantaloupe, fortified dairy products, liver, fortified cereals  Zinc: Fortified cereals, red meats, seafood  Consider supplementing with Alber by [employer]. It can be purchased on amazon, [employer] website, or local pharmacy may be able to order it for you.  (These are essential branch chain amino acids that help with tissue building and  wound healing).     Concerns:  Signs of infection may include the following:  Increase in redness  Red \"streaks\" from wound  Increase in swelling  Fever  Unusual odor  Change in the amount of wound drainage     Should you experience any significant changes in your wound(s) or have any questions regarding your home care instructions please contact the wound center [facility] @ [phone] If after regular business hours, please call your family doctor or local emergency room. The treatment plan has been discussed at length between you and your provider. Follow all instructions carefully, it is very important. If you do not follow all instructions you are at risk of your wound not healing, infection, possible loss of limb and even loss of life.

## 2025-01-30 NOTE — PROGRESS NOTES
.Weekly Wound Education Note    Teaching Provided To: Patient;Family  Training Topics: Dressing;Cleasing and general instructions;Discharge instructions;Off-loading  Training Method: Explain/Verbal;Written  Training Response: Patient responds and understands        Notes: Inital viit for right ichium wound re-open. Start honey gel and bordered gauze.Alber sample provided. Pt continues to use new electrice chair with ROHO cushion. Recommended pure-wic at night for urinary incontinence.

## 2025-01-31 ENCOUNTER — APPOINTMENT (OUTPATIENT)
Dept: WOUND CARE | Facility: HOSPITAL | Age: 40
End: 2025-01-31
Attending: NURSE PRACTITIONER
Payer: COMMERCIAL

## 2025-02-12 NOTE — PROGRESS NOTES
CHIEF COMPLAINT:     Chief Complaint   Patient presents with    Wound Care     Patient is here for a wound care follow up.      HPI:   Information obtained from Patient/guardian and chart  1-30-25 re-evaluation:  patient is a 38 yo female with hx of spastic quadriplegia cerebral palsy who presents with pressure injury to the right ischium.  I saw the patient in September of last year and she was discharged after healing a right buttocks wound. In summer/fall patient had received a new chair s/p seating eval however at the time of discharge she was having difficulty and was resistant to the change from her old chair. She presents today in the new chair. It has a roho cushion in it, it is tilt in space.  Nu-motion is actually coming today to make some adjustments to the head piece.  She is incontinent bowel and bladder. She sleeps in a regular bed on her stomach. she did spend 4 days in [place] about 2 weeks ago while mom had cataract surgery.  She had a physical with her primary in January and it was noted that she had a small \"pea size\" pressure injury. The wound has been present for about 1 month. They have been treating with honey and gentamicin and leaving NIGEL at [facility].  We discussed that we will continue to utilize the honey, stop the gent, leave it covered at [facility]. Cleanse with anasept and retry the tamir. No s/s of infection. The area is painful to touch.    2-13-25 patient returns.  They have been cleansing with anasept and dressing the wound with honey.  Wound with significant improvement, no s/s of infection. Will utilize silver foam. Patient to return in 2 weeks.    MEDICATIONS:     Current Outpatient Medications:     Acetaminophen  MG Oral Tab CR, Take 1 tablet (650 mg total) by mouth every 6 (six) hours as needed for Pain. Chewable Tablet Preferred, Disp: , Rfl:     baclofen 10 MG Oral Tab, Take 1 tablet (10 mg total) by mouth 2 (two) times daily., Disp: 180 tablet, Rfl: 3    ALPRAZolam 0.25 MG Oral  Tab, Take 1 tablet (0.25 mg total) by mouth nightly as needed., Disp: 10 tablet, Rfl: 0  ALLERGIES:   No Known Allergies   REVIEW OF SYSTEMS:   This information was obtained from the patient/family and chart.    See HPI for pertinent positives, otherwise 10 pt ROS negative.  HISTORY:   Past medical, surgical, family and social history updated where appropriate.  PHYSICAL EXAM:     Vitals:    02/13/25 0950   BP: 108/66   Pulse: 112   Resp: 16   Temp: 98.5 °F (36.9 °C)      Estimated body mass index is 15.94 kg/m² as calculated from the following:    Height as of 1/30/25: 63\".    Weight as of 1/30/25: 90 lb (40.8 kg).   No results found for: \"PGLU\"    Vital signs reviewed.Appears stated age, well groomed.    Constitutional:  Bp wnl for patient. Pulse Regular and tachy but wnl for patient (per mom). Respirations easy and unlabored. Temperature wnl. Patient is thin. Appearance neat and clean. Appears in no acute distress.    Musculoskeletal:  Patient is dependent for all transfers and mobility, contractures of upper extremities, hips.  Integumentary:  refer to wound characteristics and images   Psychiatric:  Judgment and insight intact of parent/guardian. Alert and oriented times 3. No evidence of depression, anxiety, or agitation. Calm, cooperative, and communicative.    DIAGNOSTICS:     Lab Results   Component Value Date    BUN 9 06/13/2024    CREATSERUM 0.36 (L) 06/13/2024    ALB 4.1 06/13/2024    TP 8.1 06/13/2024       WOUND ASSESSMENT:     Wound 01/30/25 #1 Ischium Right (Active)   Date First Assessed/Time First Assessed: 01/30/25 1100    Wound Number (Wound Clinic Only): #1  Primary Wound Type: Pressure Injury  Location: Ischium  Wound Location Orientation: Right      Assessments 1/30/2025 11:01 AM 2/13/2025  9:54 AM   Wound Image       Drainage Amount Unable to assess Scant   Drainage Description -- Serous;Yellow   Treatments Honey Gel --   Dressing Bandaid --   Wound Length (cm) 1.5 cm 0.5 cm   Wound Width (cm)  0.4 cm 0.2 cm   Wound Surface Area (cm^2) 0.6 cm^2 0.1 cm^2   Wound Depth (cm) 0.3 cm 0.1 cm   Wound Volume (cm^3) 0.18 cm^3 0.01 cm^3   Wound Healing % -- 94   Margins Well-defined edges Well-defined edges   Non-staged Wound Description Full thickness Full thickness   Rika-wound Assessment Dry;Blanchable erythema Dry   Wound Granulation Tissue Red;Firm Firm;Pink   Wound Bed Granulation (%) 70 % 100 %   Wound Bed Slough (%) 30 % --   Wound Odor None --   Tunneling? No No   Undermining? No No   Sinus Tracts? No No   Pressure Injury Stage Stage 3 Stage 3       No associated orders.        ASSESSMENT AND PLAN:    1. Spastic quadriplegic cerebral palsy (HCC)    2. Pressure injury of right ischium, stage 3 (HCC)      Risks, benefits, and alternatives of current treatment plan discussed in detail.  Questions and concerns addressed. Red flags to RTC or ED reviewed.  Patient (or parent) agrees to plan.      NOTE TO PATIENT: The 21st Century Cures Act makes clinical notes like these available to patients in the interest of transparency. Clinical notes are medical documents used by physicians and care providers to communicate with each other. These documents include medical language and terminology, abbreviations, and treatment information that may sound technical and at times possibly unfamiliar. In addition, at times, the verbiage may appear blunt or direct. These documents are one tool providers use to communicate relevant information and clinical opinions of the care providers in a way that allows common understanding of the clinical context.   I spent  20 minutes with the patient. This time included:    preparing to see the patient (eg, review notes and recent diagnostics),  seeing the patient, obtaining and/or reviewing separately obtained history, performing a medically appropriate examination and/or evaluation, counseling and educating the patient, documenting in the record   DISCHARGE:      Patient Instructions    Please return:    2 WEEKS    Patient discharge and wound care instructions  Colleen Cunningham  2/13/2025           You may shower and cleanse area with mild soap and water, dab dry with gauze and apply your dressings as directed.     Changing your dressing:              MAY LEAVE IN PLACE FOR 3 DAYS IF IT STAYS CLEAN WITH INCONTINENCE, CHANGE IF NEEDED  Wash your hands with soap and water.  Ensure that the old dressing is removed completely. Place it in a plastic bag and throw it in the trash.  Cleanse the wound with hypochlorous wound cleanser (ie. Anasept, vashe, pure and clean) .         Apply the following dressings: silver foam>bandaid    Offloading:  Off-loading is an important part of your wound treatment program. It is a part that only you can assure is accomplished. If you have any concerns about methods to off-load your wound, or feel that you might have trouble following the off-loading plan prescribed for you, talk to your doctor so that alternatives can be discussed that will work in your situation.    EHOB waffle cushion can be purchased online or through a pharmacy listed in your welcome folder. Please use at all times when sitting, even when transporting in the car if possible.     Nutrition and blood sugar control:  Focus on the following:  Protein: Meats, beans, eggs, milk and yogurt particularly Greek yogurt), tofu, soy nuts, soy protein products (Follow the protein handout in your welcome folder)  Vitamin C: Citrus fruits and juices, strawberries, tomatoes, tomato juice, peppers, baked potatoes, spinach, broccoli, cauliflower, [place] sprouts, cabbage  Vitamin A: Dark green, leafy vegetables, orange or yellow vegetables, cantaloupe, fortified dairy products, liver, fortified cereals  Zinc: Fortified cereals, red meats, seafood  Consider supplementing with Alber by [employer]. It can be purchased on amazon, [facility], or local pharmacy may be able to order it for you.  (These are essential  branch chain amino acids that help with tissue building and wound healing).     Concerns:  Signs of infection may include the following:  Increase in redness  Red \"streaks\" from wound  Increase in swelling  Fever  Unusual odor  Change in the amount of wound drainage     Should you experience any significant changes in your wound(s) or have any questions regarding your home care instructions please contact the [facility] @ [phone] If after regular business hours, please call your family doctor or local emergency room. The treatment plan has been discussed at length between you and your provider. Follow all instructions carefully, it is very important. If you do not follow all instructions you are at risk of your wound not healing, infection, possible loss of limb and even loss of life.    Carol Ann Chacko FNP-C, CWCN-AP, CFCN, CSWS, WCC, DWC  2/13/2025

## 2025-02-13 ENCOUNTER — OFFICE VISIT (OUTPATIENT)
Dept: WOUND CARE | Facility: HOSPITAL | Age: 40
End: 2025-02-13
Attending: NURSE PRACTITIONER
Payer: COMMERCIAL

## 2025-02-13 VITALS
TEMPERATURE: 99 F | SYSTOLIC BLOOD PRESSURE: 108 MMHG | DIASTOLIC BLOOD PRESSURE: 66 MMHG | RESPIRATION RATE: 16 BRPM | HEART RATE: 112 BPM

## 2025-02-13 DIAGNOSIS — L89.313 PRESSURE INJURY OF RIGHT ISCHIUM, STAGE 3 (HCC): ICD-10-CM

## 2025-02-13 DIAGNOSIS — G80.0 SPASTIC QUADRIPLEGIC CEREBRAL PALSY (HCC): Primary | ICD-10-CM

## 2025-02-13 PROCEDURE — 99213 OFFICE O/P EST LOW 20 MIN: CPT | Performed by: NURSE PRACTITIONER

## 2025-02-13 NOTE — PATIENT INSTRUCTIONS
Please return:    2 WEEKS    Patient discharge and wound care instructions  Colleen Cunningham  2/13/2025           You may shower and cleanse area with mild soap and water, dab dry with gauze and apply your dressings as directed.     Changing your dressing:              MAY LEAVE IN PLACE FOR 3 DAYS IF IT STAYS CLEAN WITH INCONTINENCE, CHANGE IF NEEDED  Wash your hands with soap and water.  Ensure that the old dressing is removed completely. Place it in a plastic bag and throw it in the trash.  Cleanse the wound with hypochlorous wound cleanser (ie. Anasept, vashe, pure and clean) .         Apply the following dressings: silver foam>bandaid    Offloading:  Off-loading is an important part of your wound treatment program. It is a part that only you can assure is accomplished. If you have any concerns about methods to off-load your wound, or feel that you might have trouble following the off-loading plan prescribed for you, talk to your doctor so that alternatives can be discussed that will work in your situation.    EHOB waffle cushion can be purchased online or through a pharmacy listed in your welcome folder. Please use at all times when sitting, even when transporting in the car if possible.     Nutrition and blood sugar control:  Focus on the following:  Protein: Meats, beans, eggs, milk and yogurt particularly Greek yogurt), tofu, soy nuts, soy protein products (Follow the protein handout in your welcome folder)  Vitamin C: Citrus fruits and juices, strawberries, tomatoes, tomato juice, peppers, baked potatoes, spinach, broccoli, cauliflower, [place] sprouts, cabbage  Vitamin A: Dark green, leafy vegetables, orange or yellow vegetables, cantaloupe, fortified dairy products, liver, fortified cereals  Zinc: Fortified cereals, red meats, seafood  Consider supplementing with Alber by [employer]. It can be purchased on amazon, [employer], or local pharmacy may be able to order it for you.  (These are essential  branch chain amino acids that help with tissue building and wound healing).     Concerns:  Signs of infection may include the following:  Increase in redness  Red \"streaks\" from wound  Increase in swelling  Fever  Unusual odor  Change in the amount of wound drainage     Should you experience any significant changes in your wound(s) or have any questions regarding your home care instructions please contact the [facility] @ [phone] If after regular business hours, please call your family doctor or local emergency room. The treatment plan has been discussed at length between you and your provider. Follow all instructions carefully, it is very important. If you do not follow all instructions you are at risk of your wound not healing, infection, possible loss of limb and even loss of life.

## 2025-02-13 NOTE — PROGRESS NOTES
.Weekly Wound Education Note    Teaching Provided To: Patient;Family  Training Topics: Dressing;Cleasing and general instructions;Discharge instructions  Training Method: Explain/Verbal;Written  Training Response: Patient responds and understands        Notes: Wound improving. Dressing changed to silver foam and bandaid to hold in place. Extra dressing provided.

## 2025-02-27 ENCOUNTER — OFFICE VISIT (OUTPATIENT)
Dept: WOUND CARE | Facility: HOSPITAL | Age: 40
End: 2025-02-27
Attending: NURSE PRACTITIONER
Payer: COMMERCIAL

## 2025-02-27 VITALS
SYSTOLIC BLOOD PRESSURE: 108 MMHG | HEART RATE: 95 BPM | DIASTOLIC BLOOD PRESSURE: 72 MMHG | RESPIRATION RATE: 16 BRPM | TEMPERATURE: 98 F

## 2025-02-27 DIAGNOSIS — L89.313 PRESSURE INJURY OF RIGHT ISCHIUM, STAGE 3 (HCC): ICD-10-CM

## 2025-02-27 DIAGNOSIS — G80.0 SPASTIC QUADRIPLEGIC CEREBRAL PALSY (HCC): Primary | ICD-10-CM

## 2025-02-27 PROCEDURE — 99213 OFFICE O/P EST LOW 20 MIN: CPT | Performed by: NURSE PRACTITIONER

## 2025-02-27 NOTE — PATIENT INSTRUCTIONS
Patient discharge and wound care instructions  Colleen Cunningham  2/27/2025     Continue to monitor    Keep clean and dry    If area is too moist, may utilize COLOPLAST TRIAD HYDROPHILLIC PASTE    Continue to offload and change positions office    Return as needed  
Karina Damon  Obstetrics and Gynecology  [address] [place] [id number]  Phone: [phone]  Fax: [phone]  Follow Up Time:

## 2025-02-27 NOTE — PROGRESS NOTES
CHIEF COMPLAINT:     Chief Complaint   Patient presents with    Wound Care     Patients is here for a follow up. Mom stated no issue at this moment      HPI:   Information obtained from Patient/guardian and chart  1-30-25 re-evaluation:  patient is a 38 yo female with hx of spastic quadriplegia cerebral palsy who presents with pressure injury to the right ischium.  I saw the patient in September of last year and she was discharged after healing a right buttocks wound. In summer/fall patient had received a new chair s/p seating eval however at the time of discharge she was having difficulty and was resistant to the change from her old chair. She presents today in the new chair. It has a roho cushion in it, it is tilt in space.  [employer] is actually coming today to make some adjustments to the head piece.  She is incontinent bowel and bladder. She sleeps in a regular bed on her stomach. she did spend 4 days in [place] about 2 weeks ago while mom had cataract surgery.  She had a physical with her primary in January and it was noted that she had a small \"pea size\" pressure injury. The wound has been present for about 1 month. They have been treating with honey and gentamicin and leaving NIGEL at [facility].  We discussed that we will continue to utilize the honey, stop the gent, leave it covered at [facility]. Cleanse with anasept and retry the tamir. No s/s of infection. The area is painful to touch.    2-13-25 patient returns.  They have been cleansing with anasept and dressing the wound with honey.  Wound with significant improvement, no s/s of infection. Will utilize silver foam. Patient to return in 2 weeks.    2-27-25 patient returns, mom returned to honey after a couple days. The wound is resolved, we applied 3 m advanced skin protectant today and discussed use of triad paste as needed for moisture management.  Patient dc'd from clinic to f/u as needed.    MEDICATIONS:     Current Outpatient Medications:     Acetaminophen   MG Oral Tab CR, Take 1 tablet (650 mg total) by mouth every 6 (six) hours as needed for Pain. Chewable Tablet Preferred, Disp: , Rfl:     baclofen 10 MG Oral Tab, Take 1 tablet (10 mg total) by mouth 2 (two) times daily., Disp: 180 tablet, Rfl: 3    ALPRAZolam 0.25 MG Oral Tab, Take 1 tablet (0.25 mg total) by mouth nightly as needed., Disp: 10 tablet, Rfl: 0  ALLERGIES:   No Known Allergies   REVIEW OF SYSTEMS:   This information was obtained from the patient/family and chart.    See HPI for pertinent positives, otherwise 10 pt ROS negative.  HISTORY:   Past medical, surgical, family and social history updated where appropriate.  PHYSICAL EXAM:     Vitals:    02/27/25 0700   BP: 108/72   Pulse: 95   Resp: 16   Temp: 98.1 °F (36.7 °C)      Estimated body mass index is 15.94 kg/m² as calculated from the following:    Height as of 1/30/25: 63\".    Weight as of 1/30/25: 90 lb (40.8 kg).   No results found for: \"PGLU\"    Vital signs reviewed.Appears stated age, well groomed.    Constitutional:  Bp wnl for patient. Pulse Regular and tachy but wnl for patient (per mom). Respirations easy and unlabored. Temperature wnl. Patient is thin. Appearance neat and clean. Appears in no acute distress.    Musculoskeletal:  Patient is dependent for all transfers and mobility, contractures of upper extremities, hips.  Integumentary:  refer to wound characteristics and images   Psychiatric:  Judgment and insight intact of parent/guardian. Alert and oriented times 3. No evidence of depression, anxiety, or agitation. Calm, cooperative, and communicative.    DIAGNOSTICS:     Lab Results   Component Value Date    BUN 9 06/13/2024    CREATSERUM 0.36 (L) 06/13/2024    ALB 4.1 06/13/2024    TP 8.1 06/13/2024       WOUND ASSESSMENT:     Wound 01/30/25 #1 Ischium Right (Active)   Date First Assessed/Time First Assessed: 01/30/25 1100    Wound Number (Wound Clinic Only): #1  Primary Wound Type: Pressure Injury  Location: Ischium  Wound Location  Orientation: Right      Assessments 1/30/2025 11:01 AM 2/27/2025  8:53 AM   Wound Image       Drainage Amount Unable to assess None   Treatments Honey Gel --   Dressing Bandaid --   Wound Length (cm) 1.5 cm 0 cm   Wound Width (cm) 0.4 cm 0 cm   Wound Surface Area (cm^2) 0.6 cm^2 0 cm^2   Wound Depth (cm) 0.3 cm 0 cm   Wound Volume (cm^3) 0.18 cm^3 0 cm^3   Wound Healing % -- 100   Margins Well-defined edges Flat and Intact   Non-staged Wound Description Full thickness Full thickness   Rika-wound Assessment Dry;Blanchable erythema Clean   Wound Granulation Tissue Red;Firm --   Wound Bed Granulation (%) 70 % --   Wound Bed Epithelium (%) -- 100 %   Wound Bed Slough (%) 30 % --   Wound Odor None None   Tunneling? No --   Undermining? No --   Sinus Tracts? No --   Pressure Injury Stage Stage 3 Stage 3       No associated orders.        ASSESSMENT AND PLAN:    1. Spastic quadriplegic cerebral palsy (HCC)    2. Pressure injury of right ischium, stage 3 (HCC)        Risks, benefits, and alternatives of current treatment plan discussed in detail.  Questions and concerns addressed. Red flags to RTC or ED reviewed.  Patient (or parent) agrees to plan.      NOTE TO PATIENT: The 21st Century Cures Act makes clinical notes like these available to patients in the interest of transparency. Clinical notes are medical documents used by physicians and care providers to communicate with each other. These documents include medical language and terminology, abbreviations, and treatment information that may sound technical and at times possibly unfamiliar. In addition, at times, the verbiage may appear blunt or direct. These documents are one tool providers use to communicate relevant information and clinical opinions of the care providers in a way that allows common understanding of the clinical context.   I spent 25 minutes with the patient. This time included:    preparing to see the patient (eg, review notes and recent diagnostics),   seeing the patient, obtaining and/or reviewing separately obtained history, performing a medically appropriate examination and/or evaluation, counseling and educating the patient, documenting in the record   DISCHARGE:      Patient Instructions   Patient discharge and wound care instructions  Colleen Cunningham  2/27/2025     Continue to monitor    Keep clean and dry    If area is too moist, may utilize COLOPLAST TRIAD HYDROPHILLIC PASTE    Continue to offload and change positions office    Return as needed   Carol Ann Chacko FNP-C, CWCN-AP, CFCN, CSWS, WCC, DWC  2/27/2025

## 2025-02-27 NOTE — PROGRESS NOTES
.Weekly Wound Education Note    Teaching Provided To: Patient;Family  Training Topics: Discharge instructions;Off-loading;Skin care  Training Method: Explain/Verbal;Written  Training Response: Patient responds and understands        Notes: Per provider, wound is healed. 3M skin protectant applied. If pt's mother notices area starting to breakdown again, apply triad paste and call clinic. Stated understanding. Pt discharged from clinic at this time.

## 2025-03-11 NOTE — TELEPHONE ENCOUNTER
Completed what I could and attached all notes as requested relevant in past 12 months. In your bin for completion/signature, thanks!  
Faxed back to sender, fax confirmed- in triage complete to hold as there is quite a few records.  
Incoming (mail or fax):  fax  Received from:  [facility]  Documentation given to:  triage in    Form fill out for [facility]  
signed  
carried

## 2025-03-20 ENCOUNTER — TELEPHONE (OUTPATIENT)
Dept: INTERNAL MEDICINE CLINIC | Facility: CLINIC | Age: 40
End: 2025-03-20

## 2025-03-20 DIAGNOSIS — M62.838 MUSCLE SPASM: ICD-10-CM

## 2025-03-20 DIAGNOSIS — G80.0 CP (CEREBRAL PALSY), SPASTIC, QUADRIPLEGIC (HCC): Primary | ICD-10-CM

## 2025-03-20 NOTE — TELEPHONE ENCOUNTER
Insurance: Rehana Cramer  Provider's Name?: Dr Starr Khan at [facility]    Provider's Specialty?: Podiatrist     Reason for Visit?: looking into botox injections    Diagnosis?: Stiffness in legs   Number of Visits Requested?: 4    Last Visit with Specialist?: 1998   Is Appt. Already Scheduled?: no         If so, Date?:    Once referral is approved [facility]    Pt has seen Dr Khan back in 1998 and would like to go back to her. Please call Pts mom back when completed.

## 2025-03-20 NOTE — TELEPHONE ENCOUNTER
Called Jennifer and they advised to place referral under physical medicine and rehab or physiatry with physical med/rehab noted in comments. I did place referral for physiatry Dr.Mary Khan with all details in comments and faxed to Jennifer, fax confirmed. Left a detailed message for Desirae on [employer] with this info.

## 2025-03-20 NOTE — TELEPHONE ENCOUNTER
Jennifer called back and wanted us to fax a new order because the one we sent was for a pediatrician they want us to send a new order with this Drs name on it. Thanks!          Dr Gasper Milton    [phone]

## 2025-08-26 ENCOUNTER — OFFICE VISIT (OUTPATIENT)
Dept: INTERNAL MEDICINE CLINIC | Facility: CLINIC | Age: 40
End: 2025-08-26

## 2025-08-26 VITALS
HEART RATE: 118 BPM | TEMPERATURE: 98 F | WEIGHT: 90 LBS | RESPIRATION RATE: 16 BRPM | SYSTOLIC BLOOD PRESSURE: 106 MMHG | OXYGEN SATURATION: 98 % | DIASTOLIC BLOOD PRESSURE: 60 MMHG | BODY MASS INDEX: 15.95 KG/M2 | HEIGHT: 63 IN

## 2025-08-26 DIAGNOSIS — K59.00 CONSTIPATION, UNSPECIFIED CONSTIPATION TYPE: ICD-10-CM

## 2025-08-26 DIAGNOSIS — F41.9 ANXIETY: ICD-10-CM

## 2025-08-26 DIAGNOSIS — G80.0 SPASTIC QUADRIPLEGIC CEREBRAL PALSY (HCC): Primary | ICD-10-CM

## 2025-08-26 DIAGNOSIS — R32 URINARY INCONTINENCE, UNSPECIFIED TYPE: ICD-10-CM

## 2025-08-26 DIAGNOSIS — Z12.4 SCREENING FOR CERVICAL CANCER: ICD-10-CM

## 2025-08-26 PROCEDURE — 99215 OFFICE O/P EST HI 40 MIN: CPT | Performed by: NURSE PRACTITIONER

## 2025-08-26 RX ORDER — ESCITALOPRAM OXALATE 5 MG/1
5 TABLET ORAL DAILY
Qty: 30 TABLET | Refills: 0 | Status: SHIPPED | OUTPATIENT
Start: 2025-08-26

## (undated) DIAGNOSIS — M62.838 MUSCLE SPASM: ICD-10-CM

## (undated) NOTE — LETTER
02/01/22    Gage Quezada  [address] [facility]    Dear Baldo Zavala records indicate that you have outstanding lab work. To schedule an appointment please call Central Scheduling at [phone].  You may also schedule on [employer] or go o

## (undated) NOTE — LETTER
07/10/17        Qi Garcia  [address]    12/23/1985     Dear Randy Winslow,    [facility] records indicate that you have outstanding lab work and or testing that was ordered for you and has not yet been completed:          Lipid Panel

## (undated) NOTE — LETTER
[facility], [facility], [place] [address] [address] [address]  PH: [phone]  FAX: [phone]     Date:  2025     Patient:  Colleen Cunningham  1985        To Whom it may concern:    This letter has been written at the patient's request. The above patient was seen at the [facility] for treatment of a medical condition.    This patient should be admitted to assisted living home due to patient's medication conditions.       Sincerely,    Amee Berry NP

## (undated) NOTE — LETTER
Date: 6/3/2021    Patient Name: Claudeen Moss          To Whom it may concern: This letter has been written at the patient's caregiver's request.   Due to the above patient's medical condition Darryl Baker is the sole care giver.     Please except this

## (undated) NOTE — LETTER
Date: 6/3/2021    Patient Name: Wallace Jeans          To Whom it may concern: This letter has been written at the patient's request. The above patient is the only primary caregiver for her mother.     This patient should be excused from attending [address] [place] [address]

## (undated) NOTE — MR AVS SNAPSHOT
[address]  [address] [place], [place] [id number] [phone]               Thank you for choosing us for your health care visit with Vivek Benson DO.   We are glad to serve you and happy to provide you with th visit,  view other health information, and more. To sign up or find more information, go to [email or web address] and click on the Sign Up Now link in the [employer] box.      Enter your [employer] Activation Code exactly as it appears below along with yo Don’t forget strength training with weights and resistance Set goals and track your progress   You don’t need to join a gym. Home exercises work great.  Put more priority on exercise in your life                    Visit [facility] online at

## (undated) NOTE — LETTER
12/04/20        Niecy Ramos  [address] [facility]      Dear Rashmi Adorno,    This is a friendly reminder to let you know you have outstanding lab work as listed below.   To schedule an appointment, please go to [email or web address] or call Ce